# Patient Record
Sex: FEMALE | Race: WHITE | NOT HISPANIC OR LATINO | URBAN - METROPOLITAN AREA
[De-identification: names, ages, dates, MRNs, and addresses within clinical notes are randomized per-mention and may not be internally consistent; named-entity substitution may affect disease eponyms.]

---

## 2017-01-02 ENCOUNTER — OFFICE VISIT - RIVER FALLS (OUTPATIENT)
Dept: FAMILY MEDICINE | Facility: CLINIC | Age: 52
End: 2017-01-02

## 2017-01-02 ASSESSMENT — MIFFLIN-ST. JEOR: SCORE: 1365.41

## 2017-01-06 ENCOUNTER — TRANSFERRED RECORDS (OUTPATIENT)
Dept: HEALTH INFORMATION MANAGEMENT | Facility: CLINIC | Age: 52
End: 2017-01-06

## 2017-01-06 LAB — HPV ABSTRACT: NORMAL

## 2017-08-16 ENCOUNTER — OFFICE VISIT - RIVER FALLS (OUTPATIENT)
Dept: FAMILY MEDICINE | Facility: CLINIC | Age: 52
End: 2017-08-16

## 2017-08-16 ASSESSMENT — MIFFLIN-ST. JEOR: SCORE: 1350.89

## 2017-08-17 LAB
CREAT SERPL-MCNC: 0.92 MG/DL (ref 0.5–1.05)
GLUCOSE BLD-MCNC: 78 MG/DL (ref 65–99)

## 2018-01-18 ENCOUNTER — OFFICE VISIT - RIVER FALLS (OUTPATIENT)
Dept: FAMILY MEDICINE | Facility: CLINIC | Age: 53
End: 2018-01-18

## 2018-01-18 ASSESSMENT — MIFFLIN-ST. JEOR: SCORE: 1370.85

## 2018-04-24 ENCOUNTER — OFFICE VISIT - RIVER FALLS (OUTPATIENT)
Dept: FAMILY MEDICINE | Facility: CLINIC | Age: 53
End: 2018-04-24

## 2018-04-24 ASSESSMENT — MIFFLIN-ST. JEOR: SCORE: 1374.48

## 2018-10-15 ENCOUNTER — OFFICE VISIT - RIVER FALLS (OUTPATIENT)
Dept: FAMILY MEDICINE | Facility: CLINIC | Age: 53
End: 2018-10-15

## 2018-10-15 ASSESSMENT — MIFFLIN-ST. JEOR: SCORE: 1374.48

## 2018-12-04 ENCOUNTER — OFFICE VISIT - RIVER FALLS (OUTPATIENT)
Dept: FAMILY MEDICINE | Facility: CLINIC | Age: 53
End: 2018-12-04

## 2018-12-04 ASSESSMENT — MIFFLIN-ST. JEOR: SCORE: 1374.48

## 2019-08-15 ENCOUNTER — OFFICE VISIT - RIVER FALLS (OUTPATIENT)
Dept: FAMILY MEDICINE | Facility: CLINIC | Age: 54
End: 2019-08-15

## 2019-08-15 ASSESSMENT — MIFFLIN-ST. JEOR: SCORE: 1349.08

## 2019-11-26 ENCOUNTER — OFFICE VISIT - RIVER FALLS (OUTPATIENT)
Dept: FAMILY MEDICINE | Facility: CLINIC | Age: 54
End: 2019-11-26

## 2019-11-26 ASSESSMENT — MIFFLIN-ST. JEOR: SCORE: 1348.17

## 2019-11-28 LAB — BACTERIA SPEC CULT: ABNORMAL

## 2019-11-29 ENCOUNTER — COMMUNICATION - RIVER FALLS (OUTPATIENT)
Dept: FAMILY MEDICINE | Facility: CLINIC | Age: 54
End: 2019-11-29

## 2019-12-05 ENCOUNTER — COMMUNICATION - RIVER FALLS (OUTPATIENT)
Dept: FAMILY MEDICINE | Facility: CLINIC | Age: 54
End: 2019-12-05

## 2020-02-25 ENCOUNTER — OFFICE VISIT - RIVER FALLS (OUTPATIENT)
Dept: FAMILY MEDICINE | Facility: CLINIC | Age: 55
End: 2020-02-25

## 2020-02-25 ASSESSMENT — MIFFLIN-ST. JEOR: SCORE: 1402.6

## 2020-03-30 ENCOUNTER — COMMUNICATION - RIVER FALLS (OUTPATIENT)
Dept: FAMILY MEDICINE | Facility: CLINIC | Age: 55
End: 2020-03-30

## 2020-11-30 ENCOUNTER — COMMUNICATION - RIVER FALLS (OUTPATIENT)
Dept: FAMILY MEDICINE | Facility: CLINIC | Age: 55
End: 2020-11-30

## 2020-12-08 ENCOUNTER — OFFICE VISIT - RIVER FALLS (OUTPATIENT)
Dept: FAMILY MEDICINE | Facility: CLINIC | Age: 55
End: 2020-12-08

## 2021-06-24 ENCOUNTER — OFFICE VISIT - RIVER FALLS (OUTPATIENT)
Dept: FAMILY MEDICINE | Facility: CLINIC | Age: 56
End: 2021-06-24

## 2021-07-23 ENCOUNTER — COMMUNICATION - RIVER FALLS (OUTPATIENT)
Dept: FAMILY MEDICINE | Facility: CLINIC | Age: 56
End: 2021-07-23

## 2022-02-11 VITALS
TEMPERATURE: 97.1 F | HEIGHT: 68 IN | HEART RATE: 89 BPM | BODY MASS INDEX: 24.52 KG/M2 | OXYGEN SATURATION: 98 % | DIASTOLIC BLOOD PRESSURE: 76 MMHG | SYSTOLIC BLOOD PRESSURE: 124 MMHG | WEIGHT: 161.8 LBS

## 2022-02-11 VITALS
OXYGEN SATURATION: 99 % | WEIGHT: 161 LBS | HEIGHT: 68 IN | SYSTOLIC BLOOD PRESSURE: 118 MMHG | TEMPERATURE: 97.4 F | HEART RATE: 90 BPM | DIASTOLIC BLOOD PRESSURE: 74 MMHG | BODY MASS INDEX: 24.4 KG/M2

## 2022-02-11 VITALS
WEIGHT: 156.6 LBS | OXYGEN SATURATION: 99 % | HEIGHT: 68 IN | SYSTOLIC BLOOD PRESSURE: 122 MMHG | BODY MASS INDEX: 23.73 KG/M2 | DIASTOLIC BLOOD PRESSURE: 66 MMHG | HEART RATE: 85 BPM

## 2022-02-11 VITALS
HEIGHT: 68 IN | BODY MASS INDEX: 25.46 KG/M2 | WEIGHT: 168 LBS | SYSTOLIC BLOOD PRESSURE: 124 MMHG | DIASTOLIC BLOOD PRESSURE: 86 MMHG | HEART RATE: 80 BPM

## 2022-02-11 VITALS
BODY MASS INDEX: 23.64 KG/M2 | OXYGEN SATURATION: 97 % | HEIGHT: 68 IN | WEIGHT: 156 LBS | TEMPERATURE: 97.6 F | HEART RATE: 103 BPM

## 2022-02-11 VITALS
TEMPERATURE: 96 F | HEIGHT: 68 IN | HEART RATE: 68 BPM | DIASTOLIC BLOOD PRESSURE: 76 MMHG | SYSTOLIC BLOOD PRESSURE: 116 MMHG | BODY MASS INDEX: 24.22 KG/M2 | WEIGHT: 159.8 LBS

## 2022-02-11 VITALS
SYSTOLIC BLOOD PRESSURE: 128 MMHG | BODY MASS INDEX: 24.52 KG/M2 | OXYGEN SATURATION: 92 % | DIASTOLIC BLOOD PRESSURE: 72 MMHG | HEIGHT: 68 IN | WEIGHT: 161.8 LBS | HEART RATE: 102 BPM | TEMPERATURE: 97.4 F

## 2022-02-11 VITALS
BODY MASS INDEX: 23.67 KG/M2 | OXYGEN SATURATION: 99 % | HEIGHT: 68 IN | HEART RATE: 87 BPM | DIASTOLIC BLOOD PRESSURE: 88 MMHG | SYSTOLIC BLOOD PRESSURE: 122 MMHG | WEIGHT: 156.2 LBS

## 2022-02-11 VITALS
HEIGHT: 68 IN | HEART RATE: 93 BPM | WEIGHT: 161.8 LBS | DIASTOLIC BLOOD PRESSURE: 80 MMHG | BODY MASS INDEX: 24.52 KG/M2 | SYSTOLIC BLOOD PRESSURE: 126 MMHG | TEMPERATURE: 97.5 F | OXYGEN SATURATION: 99 %

## 2022-02-16 NOTE — TELEPHONE ENCOUNTER
"---------------------  From: Jayla Santana CMA   To: Giovanni Rothman MD;     Sent: 3/26/2020 3:40:14 PM CDT  Subject: General Message     Has had a bronchitis cough since her toe injury in Feb.. she states that it is icky tasting and \"can't shake it\"  she is wondering if there is something that she can take for it.      Lydia  [phone]---------------------  From: Giovanni Rothman MD   To: Jayla Santana CMA;     Sent: 3/26/2020 4:13:29 PM CDT  Subject: RE: General Message     eRx sentpt notifiedpt called back to say that med has done nothing and last time she ended up with 2 abx and the 2nd one helped....per record review, Joe was called to [facility]..If pt not better after finishing then she will need to have a telephone visit with [facility].  Pt voiced understanding  "

## 2022-02-16 NOTE — NURSING NOTE
Comprehensive Intake Entered On:  11/26/2019 2:31 PM CST    Performed On:  11/26/2019 2:29 PM CST by Jayla Santana CMA               Summary   Chief Complaint :   Pt here for ST   Weight Measured :   156 lb(Converted to: 156 lb 0 oz, 70.76 kg)    Height Measured :   67.5 in(Converted to: 5 ft 7 in, 171.45 cm)    Body Mass Index :   24.07 kg/m2   Body Surface Area :   1.83 m2   Peripheral Pulse Rate :   103 bpm (HI)    Temperature Tympanic :   97.6 DegF(Converted to: 36.4 DegC)  (LOW)    Oxygen Saturation :   97 %   Jayla Santana CMA - 11/26/2019 2:29 PM CST   Health Status   Allergies Verified? :   Yes   Medication History Verified? :   Yes   Medical History Verified? :   Yes   Pre-Visit Planning Status :   Not completed   Tobacco Use? :   Current every day smoker   Jayla Santana CMA - 11/26/2019 2:29 PM CST   Consents   Consent for Immunization Exchange :   Consent Granted   Consent for Immunizations to Providers :   Consent Granted   Jayla Santana CMA - 11/26/2019 2:29 PM CST   Meds / Allergies   (As Of: 11/26/2019 2:31:56 PM CST)   Allergies (Active)   No known allergies  Estimated Onset Date:   Unspecified ; Created By:   Lydia Ruvalcaba; Reaction Status:   Active ; Category:   Drug ; Substance:   No known allergies ; Type:   Allergy ; Updated By:   Lydia Ruvalcaba; Reviewed Date:   11/26/2019 2:29 PM CST        Medication List   (As Of: 11/26/2019 2:31:56 PM CST)   Prescription/Discharge Order    ALPRAZolam  :   ALPRAZolam ; Status:   Prescribed ; Ordered As Mnemonic:   ALPRAZolam 0.25 mg oral tablet ; Simple Display Line:   0.25 mg, 1 tab(s), PO, daily, PRN: for anxiety, 30 tab(s), 0 Refill(s) ; Ordering Provider:   Giovanni Rothman MD; Catalog Code:   ALPRAZolam ; Order Dt/Tm:   8/15/2019 1:20:20 PM CDT          fluticasone nasal  :   fluticasone nasal ; Status:   Prescribed ; Ordered As Mnemonic:   fluticasone 50 mcg/inh nasal spray ; Simple Display Line:   2 spray(s), nasal, bid, 1 EA, 5 Refill(s) ;  Ordering Provider:   Giovanni Rothman MD; Catalog Code:   fluticasone nasal ; Order Dt/Tm:   8/15/2019 1:19:59 PM CDT          hydrochlorothiazide-lisinopril  :   hydrochlorothiazide-lisinopril ; Status:   Prescribed ; Ordered As Mnemonic:   hydrochlorothiazide-lisinopril 12.5 mg-10 mg oral tablet ; Simple Display Line:   0.5 tab(s), Oral, daily, due for med appt, 45 tab(s), 1 Refill(s) ; Ordering Provider:   Giovanni Rothman MD; Catalog Code:   hydrochlorothiazide-lisinopril ; Order Dt/Tm:   8/15/2019 1:19:31 PM CDT

## 2022-02-16 NOTE — NURSING NOTE
Vital Signs Entered On:  8/14/2019 1:20 PM CDT    Performed On:  8/14/2019 1:19 PM CDT by Jayla Santana CMA               Vital Signs   Systolic Blood Pressure :   124 mmHg   Diastolic Blood Pressure :   76 mmHg   Mean Arterial Pressure :   92 mmHg   Peripheral Pulse Rate :   89 bpm   Jayla Santana CMA - 8/14/2019 1:19 PM CDT

## 2022-02-16 NOTE — LETTER
(Inserted Image. Unable to display)     April 29, 2019      TYLER GARCIAYNOR   [place], [place] [id number]          Dear TYLER,      Thank you for selecting [facility] (previously [facility]) for your healthcare needs.    Our records indicate you are due for the following services:    Annual Physical.  Hypertension check ~ please remember to bring your at-home blood pressure readings with you to your appointment.   Non-Fasting Labs.    If you had your labs done at another facility or with Direct Access Lab Testing at [facility], please bring in a copy of the results to your next visit, mail a copy, or drop off a copy of your results to your Healthcare Provider.    You are due for lab work and an office visit; please schedule the lab appointment 1 week before the office visit.  This will assure all results are available to discuss with your provider during your visit.    **It is very helpful if you bring your medication bottles to your appointment.  This assures we have all of your current medications, including strength and dosing information, documented accurately in your medical record.    To schedule an appointment or if you have further questions, please contact your primary clinic:   [facility]       [phone]   [facility]       [phone]              [facility]     [phone]      Powered by [employer] and [employer]    Sincerely,    Giovanni Rothman MD

## 2022-02-16 NOTE — PROGRESS NOTES
Patient is called and informed of chest CT showing 5 small pulmonary nodules, given active smoking hx she is at   high risk, per guidelines will repeat chest CT in 3 months.    She is also having ongoing intermittent LLQ pain.  No response to treatment for diverticulitis in mid Jan.  Chest and abdominal  CT do not reveal a cause for this ongoing pain.  Will refer to GI for further evaluation

## 2022-02-16 NOTE — NURSING NOTE
Comprehensive Intake Entered On:  8/15/2019 1:09 PM CDT    Performed On:  8/15/2019 12:59 PM CDT by Jayla Santana CMA               Summary   Chief Complaint :   Pt here for med ck   Weight Measured :   156.2 lb(Converted to: 156 lb 3 oz, 70.85 kg)    Height Measured :   67.5 in(Converted to: 5 ft 7 in, 171.45 cm)    Body Mass Index :   24.1 kg/m2   Body Surface Area :   1.84 m2   Systolic Blood Pressure :   122 mmHg   Diastolic Blood Pressure :   88 mmHg (HI)    Mean Arterial Pressure :   99 mmHg   Peripheral Pulse Rate :   87 bpm   Oxygen Saturation :   99 %   Jayla Santana CMA - 8/15/2019 12:59 PM CDT   Health Status   Allergies Verified? :   Yes   Medication History Verified? :   Yes   Medical History Verified? :   Yes   Pre-Visit Planning Status :   Completed   Tobacco Use? :   Current every day smoker   Jayla Santana CMA - 8/15/2019 12:59 PM CDT   Consents   Consent for Immunization Exchange :   Consent Granted   Consent for Immunizations to Providers :   Consent Granted   Jayla Santana CMA - 8/15/2019 12:59 PM CDT   Meds / Allergies   (As Of: 8/15/2019 1:09:23 PM CDT)   Allergies (Active)   No known allergies  Estimated Onset Date:   Unspecified ; Created By:   Lydia Ruvalcaba; Reaction Status:   Active ; Category:   Drug ; Substance:   No known allergies ; Type:   Allergy ; Updated By:   Lydia Ruvalcaba; Reviewed Date:   8/15/2019 1:08 PM CDT        Medication List   (As Of: 8/15/2019 1:09:23 PM CDT)   Prescription/Discharge Order    ALPRAZolam  :   ALPRAZolam ; Status:   Prescribed ; Ordered As Mnemonic:   ALPRAZolam 0.25 mg oral tablet ; Simple Display Line:   0.25 mg, 1 tab(s), PO, daily, PRN: for anxiety, 30 tab(s), 0 Refill(s) ; Ordering Provider:   Giovanni Rothman MD; Catalog Code:   ALPRAZolam ; Order Dt/Tm:   4/24/2018 3:51:40 PM          fluticasone nasal  :   fluticasone nasal ; Status:   Prescribed ; Ordered As Mnemonic:   fluticasone 50 mcg/inh nasal spray ; Simple Display Line:   2  spray(s), nasal, bid, 1 EA, 5 Refill(s) ; Ordering Provider:   Giovanni Rothman MD; Catalog Code:   fluticasone nasal ; Order Dt/Tm:   12/4/2018 4:50:16 PM          hydrochlorothiazide-lisinopril  :   hydrochlorothiazide-lisinopril ; Status:   Prescribed ; Ordered As Mnemonic:   hydrochlorothiazide-lisinopril 12.5 mg-10 mg oral tablet ; Simple Display Line:   1 tab(s), Oral, daily, due for med appt, 90 tab(s), 0 Refill(s) ; Ordering Provider:   Giovanni Rothman MD; Catalog Code:   hydrochlorothiazide-lisinopril ; Order Dt/Tm:   4/10/2019 4:10:06 PM          lidocaine topical  :   lidocaine topical ; Status:   Processing ; Ordered As Mnemonic:   lidocaine 5% topical film ; Ordering Provider:   Giovanni Rothman MD; Action Display:   Complete ; Catalog Code:   lidocaine topical ; Order Dt/Tm:   8/15/2019 1:08:08 PM

## 2022-02-16 NOTE — TELEPHONE ENCOUNTER
Entered by Jayla Santana CMA on March 30, 2020 8:59:54 AM CDT  ---------------------  From: Jayla Santana CMA   To: [facility]    Sent: 3/30/2020 8:59:54 AM CDT  Subject: Medication Management     ** Submitted: **  Order:varenicline (Chantix Continuing Month 1 mg oral tablet)  See Instructions  TAKE ONE (1) TABLET TWICE DAILY  Qty:  56 EA        Days Supply:  28        Refills:  0          Substitutions Allowed     Route To [facility]    Signed by Jayla Santana CMA  3/30/2020 1:59:00 PM    ** Submitted: **  Complete:varenicline (Chantix Continuing Month 1 mg oral tablet)   Signed by Jayla Santana CMA  3/30/2020 1:59:00 PM    ** Not Approved:  **  varenicline (CHANTIX VANDANA 1MG)  TAKE ONE (1) TABLET TWICE DAILY  Qty:  56 EA        Days Supply:  28        Refills:  0          Substitutions Allowed     Route To [facility]   Signed by Jayla Santana CMA            ** Patient matched by Jayla Santana CMA on 3/30/2020 8:59:30 AM CDT **      ------------------------------------------  From: [facility]  To: Giovanni Rothman MD  Sent: March 26, 2020 5:11:02 PM CDT  Subject: Medication Management  Due: March 27, 2020 5:11:02 PM CDT    ** On Hold Pending Signature **  Drug: varenicline (Chantix Continuing Month 1 mg oral tablet)  TAKE ONE (1) TABLET TWICE DAILY  Quantity: 56 EA  Days Supply: 28  Refills: 0  Substitutions Allowed  Notes from Pharmacy:     Dispensed Drug: varenicline (Chantix Continuing Month 1 mg oral tablet)  TAKE ONE (1) TABLET TWICE DAILY  Quantity: 56 EA  Days Supply: 28  Refills: 0  Substitutions Allowed  Notes from Pharmacy:   ------------------------------------------

## 2022-02-16 NOTE — NURSING NOTE
Depression Screening Entered On:  6/24/2021 4:14 PM CDT    Performed On:  6/24/2021 4:14 PM CDT by Kaila Prabhakar CMA               Depression Screening   Little Interest - Pleasure in Activities :   Not at all   Feeling Down, Depressed, Hopeless :   Not at all   Initial Depression Screen Score :   0 Score   Kaila Prabhakar CMA - 6/24/2021 4:14 PM CDT

## 2022-02-16 NOTE — TELEPHONE ENCOUNTER
---------------------  From: Sierra Corona RN (eRx Pool ([id number]_[facility]))   To: Phone [facility] ([id number]);     Sent: 6/24/2021 12:26:03 PM CDT  Subject: FW: Medication Management   Due Date/Time: 6/25/2021 10:02:00 AM CDT     Pt was a [facility] pt and has not seen any other provider  She is due NOW for 6 month med check  She is past due for non-fasting labs    I LVM on listed number asking for a call back. We can fill 30 day protocol when she picks a new provider and makes appt for lab and visit.      ------------------------------------------  From: [facility]  To: Giovanni Rothman MD  Sent: June 24, 2021 10:02:39 AM CDT  Subject: Medication Management  Due: June 4, 2021 8:25:53 PM CDT     ** On Hold Pending Signature **     Drug: hydrochlorothiazide-lisinopril (hydrochlorothiazide-lisinopril 12.5 mg-10 mg oral tablet), TAKE ONE (1) TAB(S) ORAL DAILY  Quantity: 90 tab(s)  Days Supply: 90  Refills: 0  Substitutions Allowed  Notes from Pharmacy:     Dispensed Drug: hydrochlorothiazide-lisinopril (hydrochlorothiazide-lisinopril 12.5 mg-10 mg oral tablet), TAKE ONE (1) TAB(S) ORAL DAILY  Quantity: 90 tab(s)  Days Supply: 90  Refills: 0  Substitutions Allowed  Notes from Pharmacy:  ------------------------------------------MLTCB---------------------  From: Elana Hamm CMA (Phone Messages Pool ([id number]_[facility]))   To: Advanced Practice Provider Pool ([id number]_[facility]);     Sent: 6/25/2021 10:42:44 AM CDT  Subject: FW: Medication Management   Due Date/Time: 6/25/2021 10:02:00 AM CDTwill fill for 90 days, no further refills without clinic visit---------------------  From: Giovanni Viramontes PA-C   To: [facility]    Sent: 6/25/2021 12:39:20 PM CDT  Subject: FW: Medication Management     ** Submitted: **  Complete:hydrochlorothiazide-lisinopril (hydrochlorothiazide-lisinopril 12.5 mg-10 mg oral tablet)   Signed by Giovanni Viramontes PA-C  6/25/2021 5:39:00 PM [facility]    ** Approved  **  hydrochlorothiazide-lisinopril (LISINOP/HCTZ 10-12.5)  TAKE ONE (1) TAB(S) ORAL DAILY  Qty:  90 tab(s)        Days Supply:  90        Refills:  0          Substitutions Allowed     Route To Pharmacy - [facility]   Signed by Wander BROWN, Giovanni PETIT

## 2022-02-16 NOTE — PROGRESS NOTES
Patient:   TYLER ANDRADE            MRN: [id number]            FIN: [id number]               Age:   53 years     Sex:  Female     :  1965   Associated Diagnoses:   Acute bronchitis   Author:   Giovanni Rothman MD      Impression and Plan   Diagnosis     Acute bronchitis ([id number] J20.9).     Course:  Worsening.    Orders     Orders   Charges (Evaluation and Management):  90312 office outpatient visit 15 minutes (Charge) (Order): Quantity: 1, Acute bronchitis.     Orders (Selected)   Prescriptions  Prescribed  azithromycin 500 mg oral tablet: = 1 tab(s) ( 500 mg ), PO, Daily, # 7 tab(s), 0 Refill(s), Type: Maintenance, Pharmacy: [facility], 1 tab(s) Oral daily,x7 day(s).        Visit Information      Date of Service: 10/15/2018 04:20 pm  Performing Location: [facility]  Encounter#: [id number]      Primary Care Provider (PCP):  Giovanni Rothman MD    NPI# [id number]   Visit type:  New symptom.    Accompanied by:  No one.    Source of history:  Self.    History limitation:  None.       Chief Complaint   Chief complaint discussed and confirmed correct.     10/15/2018 4:27 PM CDT   Pt here for cough        History of Present Illness             The patient presents with cough.  The cough is described as paroxysmal and productive yellow sputum.  The severity of the cough is severe.  The cough is worsening.  The context of the cough: occurred in association with illness.  There are no modifying factors.  Associated symptoms consist of none.        Review of Systems   Constitutional:  Negative.    Eye:  Negative.    Ear/Nose/Mouth/Throat:  Negative.    Respiratory:  Cough.    Cardiovascular:  Negative.    Gastrointestinal:  Negative.    Genitourinary:  Negative.    Hematology/Lymphatics:  Negative.    Endocrine:  Negative.    Immunologic:  Negative.    Musculoskeletal:  Negative.    Integumentary:  Negative.    Neurologic:  Negative.    Psychiatric:  Negative.    All other systems reviewed and  negative      Health Status   Allergies:    Allergic Reactions (Selected)  No known allergies   Medications:  (Selected)   Prescriptions  Prescribed  ALPRAZolam 0.25 mg oral tablet: 1 tab(s) ( 0.25 mg ), PO, daily, PRN: for anxiety, # 30 tab(s), 0 Refill(s), Type: Maintenance, Pharmacy: [facility], 1 tab(s) po daily,PRN:for anxiety  Chantix 1 mg oral tablet: 1 tab(s) ( 1 mg ), po, bid, # 60 tab(s), 2 Refill(s), Type: Maintenance, Pharmacy: [facility], 1 tab(s) Oral bid  Chantix Starter Pack 0.5 mg-1 mg oral tablet: See Instructions, Instructions: 0.5 mg po daily for 3 days, then 0.5 mg po BID for 4 days, then 1.0 mg po BID, # 1 kit(s), 2 Refill(s), Type: Maintenance, Pharmacy: [facility], 0.5 mg po daily for 3 days, then 0.5 mg po BID for 4 days, then 1....  azithromycin 500 mg oral tablet: = 1 tab(s) ( 500 mg ), PO, Daily, # 7 tab(s), 0 Refill(s), Type: Maintenance, Pharmacy: [facility], 1 tab(s) Oral daily,x7 day(s)  fluticasone 50 mcg/inh nasal spray: 1 spray(s), nasal, daily, # 1 EA, 5 Refill(s), Type: Soft Stop, Pharmacy: [facility], 1 spray(s) Nasal daily  hydrochlorothiazide-lisinopril 12.5 mg-10 mg oral tablet: 1 tab(s), PO, Daily, # 90 tab(s), 1 Refill(s), Type: Maintenance, Pharmacy: [facility], 1 tab(s) Oral daily   Problem list:    All Problems  Allergic Rhinitis / SNOMED CT 91320431 / Confirmed  Anxiety / SNOMED CT 34633071 / Confirmed  Benign essential HTN / SNOMED CT 7025943 / Confirmed  Hypertension / ICD-9-.9 / Confirmed  Methamphetamine Abuse / ICD-9-.70 / Confirmed  Multiple pulmonary nodules / SNOMED CT 3350021741 / Confirmed  Nicotine Dependence / ICD-9-.1 / Confirmed  Raynaud's disease / ICD-9-.0 / Confirmed  Sciatica / ICD-9-.3 / Confirmed      Histories   Past Medical History:    Active  Hypertension (401.9)  Anxiety (84264212)  Sciatica (724.3)  Raynaud's disease (443.0)  Allergic Rhinitis (80878287)   Family  History:    Hypertension  Mother ()  Sister     Procedure history:    History of lumbar fusion (SNOMED CT 7796402668) performed by Thien Blevins MD on 2017 at 52 Years.  Comments:  2017 1:35 PM - Jannette Hyde  Day #1 - L3-4, L4-5, L5-S1 fusion.  Day #2 - L3-S1 fusion.  Carpal tunnel decompression (SNOMED CT 6488587991) in 2017 at 52 Years.  Trigger finger of right hand (SNOMED CT 533520618979765) in  at 47 Years.  Tubal ligation (SNOMED CT 421033586).  cervical cryotherapy.   Social History:        Alcohol Assessment: Denies Alcohol Use            Past      Tobacco Assessment: Current            Current, Cigarettes, 20 per day.      Exercise and Physical Activity Assessment: Regular exercise        Physical Examination   Vital signs reviewed  and within acceptable limits    Vital Signs   10/15/2018 4:27 PM CDT Temperature Tympanic 97.4 DegF  LOW    Peripheral Pulse Rate 102 bpm  HI    Systolic Blood Pressure 128 mmHg    Diastolic Blood Pressure 72 mmHg    Mean Arterial Pressure 91 mmHg    Oxygen Saturation 92 %  LOW      Measurements from flowsheet : Measurements   10/15/2018 4:27 PM CDT Height Measured - Standard 67.5 in    Weight Measured - Standard 161.8 lb    BSA 1.87 m2    Body Mass Index 24.96 kg/m2      General:  Alert and oriented, No acute distress.    Eye:  Pupils are equal, round and reactive to light, Extraocular movements are intact, Normal conjunctiva.    HENT:  Normocephalic, Tympanic membranes are clear, Normal hearing, Oral mucosa is moist, No pharyngeal erythema, No sinus tenderness.    Neck:  Supple, Non-tender, No lymphadenopathy, No thyromegaly.    Respiratory:  Lungs are clear to auscultation, Respirations are non-labored, Breath sounds are equal, demonstrates frequent loose cough.    Cardiovascular:  Normal rate, Regular rhythm, No murmur, Normal peripheral perfusion, No edema.    Integumentary:  Warm, Dry, Pink.    Psychiatric:  Cooperative, Appropriate mood & affect,  Normal judgment.

## 2022-02-16 NOTE — TELEPHONE ENCOUNTER
Entered by Jayla Santana CMA on March 26, 2019 7:43:43 AM CDT  ---------------------  From: Jayla Santana CMA   To: [facility]    Sent: 3/26/2019 7:43:43 AM CDT  Subject: Medication Management     ** Submitted: **  Order:hydrochlorothiazide-lisinopril (hydrochlorothiazide-lisinopril 12.5 mg-10 mg oral tablet)  1 tab(s)  Oral  daily  due for med appt  Qty:  30 tab(s)        Refills:  0          Substitutions Allowed     Route To [facility]    Signed by Jayla Santana CMA  3/26/2019 7:43:00 AM    ** Submitted: **  Complete:hydrochlorothiazide-lisinopril (hydrochlorothiazide-lisinopril 12.5 mg-10 mg oral tablet)   Signed by Jayla Santana CMA  3/26/2019 7:43:00 AM    ** Not Approved:  **  hydrochlorothiazide-lisinopril (Lisinopril-Hydrochlorothiazide Tablet 10-12.5 MG)  Take one (1) tablet daily  Qty:  90 EA        Days Supply:  0        Refills:  0          Substitutions Allowed     Route To [facility]   Signed by Jayla Santana CMA            ** Patient matched by Jayla Santana CMA on 3/26/2019 7:42:18 AM CDT **      ------------------------------------------  From: [facility]  To: Giovanni Rothman MD  Sent: March 25, 2019 3:46:18 PM CDT  Subject: Medication Management  Due: March 26, 2019 3:46:18 PM CDT    ** On Hold Pending Signature **  Drug: hydrochlorothiazide-lisinopril (hydrochlorothiazide-lisinopril 12.5 mg-10 mg oral tablet)  Take one (1) tablet daily  Quantity: 90 EA       Days Supply: 0         Refills: 0  Substitutions Allowed  Notes from Pharmacy:     Dispensed Drug: hydrochlorothiazide-lisinopril (hydrochlorothiazide-lisinopril 12.5 mg-10 mg oral tablet)  Take one (1) tablet daily  Quantity: 90 EA       Days Supply: 0         Refills: 0  Substitutions Allowed  Notes from Pharmacy:   ------------------------------------------

## 2022-02-16 NOTE — CARE COORDINATION
Pt appears on  [facility] chronic disease panel as out of parameters for tobacco use.  Mellisa Resendez, CMA.

## 2022-02-16 NOTE — CARE COORDINATION
Pt appears on [facility] chronic disease panel as out of parameters for _HTN/Tobacco.  RTC placed today for Med ck and labs in 2 months (2/2018), provider to discuss tobacco cessation next visit

## 2022-02-16 NOTE — TELEPHONE ENCOUNTER
Entered by Jayla Santana CMA on December 05, 2019 8:37:01 AM CST  ---------------------  From: Jayla Santana CMA   To: [facility]    Sent: 12/5/2019 8:37:00 AM CST  Subject: Medication Management     ** Submitted: **  Order:varenicline (Chantix Continuing Month 1 mg oral tablet)  1 tab(s)  Oral  bid  Qty:  56 tab(s)        Refills:  0          Substitutions Allowed     Route To [facility]    Signed by Jayla Santana CMA  12/5/2019 8:36:00 AM    ** Not Approved:  **  varenicline (CHANTIX VANDANA 1MG)  TAKE ONE (1) TABLET TWICE DAILY  Qty:  56 EA        Days Supply:  28        Refills:  0          Substitutions Allowed     Route To [facility]   Signed by Jayla Santana CMA            ** Patient matched by Jayla Santana CMA on 12/5/2019 8:36:25 AM CST **      ------------------------------------------  From: [facility]  To: Giovanni Rothman MD  Sent: December 4, 2019 2:27:50 PM CST  Subject: Medication Management  Due: December 5, 2019 2:27:50 PM CST    ** On Hold Pending Signature **  Drug: varenicline (Chantix Continuing Month 1 mg oral tablet)  TAKE ONE (1) TABLET TWICE DAILY  Quantity: 56 EA  Days Supply: 28  Refills: 0  Substitutions Allowed  Notes from Pharmacy:     Dispensed Drug: varenicline (Chantix Continuing Month 1 mg oral tablet)  TAKE ONE (1) TABLET TWICE DAILY  Quantity: 56 EA  Days Supply: 28  Refills: 0  Substitutions Allowed  Notes from Pharmacy:   ------------------------------------------

## 2022-02-16 NOTE — PROGRESS NOTES
Patient:   TYLER ANDRADE            MRN: [id number]            FIN: [id number]               Age:   53 years     Sex:  Female     :  1965   Associated Diagnoses:   Chronic cough; Right-sided thoracic back pain   Author:   Giovanni Rothman MD      Impression and Plan   Diagnosis     Chronic cough (ZBJ16-ON R05).     Right-sided thoracic back pain (GKO90-SI M54.6).     Course:  Worsening.    Orders     Orders   Charges (Evaluation and Management):  51765 office outpatient visit 25 minutes (Charge) (Order): Quantity: 1, Chronic cough  Right-sided thoracic back pain.     Orders (Selected)   Outpatient Orders  Ordered  CT Chest w/ Contrast (Request): Chronic cough  Prescriptions  Prescribed  fluticasone 50 mcg/inh nasal spray: = 2 spray(s), nasal, bid, # 1 EA, 5 Refill(s), Type: Soft Stop, Pharmacy: [facility], 2 spray(s) Nasal bid  lidocaine 5% topical film: 1 patch(es), Topical, daily, # 15 patch(es), 0 Refill(s), Type: Maintenance, Pharmacy: [facility], 1 patch(es) Topical daily.     Alleve 2 tabs PO BID.        Visit Information      Date of Service: 2018 04:20 pm  Performing Location: [facility]  Encounter#: [id number]      Primary Care Provider (PCP):  Giovanni Rothman MD    NPI# [id number]   Visit type:  Scheduled follow-up.    Accompanied by:  No one.    Source of history:  Self.    Referral source:  Self.    History limitation:  None.       Chief Complaint   2018 4:30 PM CST    Pt here for continued cough, back pain and trouble breathing        History of Present Illness             The patient presents with cough.  The cough is described as productive white sputum and when she lays down.  The severity of the cough is moderate.  The cough is episodic.  The cough has lasted for 2 month(s).  Exacerbating factors consist of allergen exposure, lying flat and smoke exposure.  Relieving factors consist of none.  Associated symptoms consist of chest pain, nasal  congestion, rhinorrhea, shortness of breath, denies chills and denies fever.  Did not respond to Azithromycin back in October.        Review of Systems   Constitutional:  Negative.    Eye:  Negative.    Ear/Nose/Mouth/Throat:  Negative.    Respiratory:  Negative except as documented in history of present illness.    Cardiovascular:  Negative.    Gastrointestinal:  Negative.    Genitourinary:  Negative.    Hematology/Lymphatics:  Negative.    Endocrine:  Negative.    Immunologic:  Negative.    Musculoskeletal:       Back pain: On the right side, In the upper region.    Integumentary:  Negative.    Neurologic:  Negative.    Psychiatric:  Negative.    All other systems reviewed and negative      Health Status   Allergies:    Allergic Reactions (Selected)  No known allergies   Medications:  (Selected)   Prescriptions  Prescribed  ALPRAZolam 0.25 mg oral tablet: 1 tab(s) ( 0.25 mg ), PO, daily, PRN: for anxiety, # 30 tab(s), 0 Refill(s), Type: Maintenance, Pharmacy: [facility], 1 tab(s) po daily,PRN:for anxiety  fluticasone 50 mcg/inh nasal spray: = 2 spray(s), nasal, bid, # 1 EA, 5 Refill(s), Type: Soft Stop, Pharmacy: [facility], 2 spray(s) Nasal bid  hydrochlorothiazide-lisinopril 12.5 mg-10 mg oral tablet: 1 tab(s), PO, Daily, # 90 tab(s), 1 Refill(s), Type: Maintenance, Pharmacy: [facility], 1 tab(s) Oral daily  lidocaine 5% topical film: 1 patch(es), Topical, daily, # 15 patch(es), 0 Refill(s), Type: Maintenance, Pharmacy: [facility], 1 patch(es) Topical daily   Problem list:    All Problems  Allergic Rhinitis / SNOMED CT 65008554 / Confirmed  Anxiety / SNOMED CT 52560606 / Confirmed  Benign essential HTN / SNOMED CT 8068125 / Confirmed  Hypertension / ICD-9-.9 / Confirmed  Methamphetamine Abuse / ICD-9-.70 / Confirmed  Multiple pulmonary nodules / SNOMED CT 1631722051 / Confirmed  Nicotine Dependence / ICD-9-.1 / Confirmed  Raynaud's disease / ICD-9-.0 /  Confirmed  Sciatica / ICD-9-.3 / Confirmed      Histories   Past Medical History:    Active  Hypertension (401.9)  Anxiety (04769437)  Sciatica (724.3)  Raynaud's disease (443.0)  Allergic Rhinitis (33387675)   Family History:    Hypertension  Mother ()  Sister     Procedure history:    History of lumbar fusion (SNOMED CT 2876410078) performed by Thien Blevins MD on 2017 at 52 Years.  Comments:  2017 1:35 PM CDT - Jannette Hyde  Day #1 - L3-4, L4-5, L5-S1 fusion.  Day #2 - L3-S1 fusion.  Carpal tunnel decompression (SNOMED CT 7210760810) in 2017 at 52 Years.  Trigger finger of right hand (SNOMED CT 746170445407692) in  at 47 Years.  Tubal ligation (SNOMED CT 538399513).  cervical cryotherapy.   Social History:        Alcohol Assessment: Denies Alcohol Use            Past      Tobacco Assessment: Current            Current, Cigarettes, 20 per day.      Exercise and Physical Activity Assessment: Regular exercise      Physical Examination   Vital Signs   2018 4:30 PM CST Temperature Tympanic 97.1 DegF  LOW    Peripheral Pulse Rate 96 bpm    Systolic Blood Pressure 122 mmHg    Diastolic Blood Pressure 78 mmHg    Mean Arterial Pressure 93 mmHg    BP Site Right arm    Oxygen Saturation 98 %      Measurements from flowsheet : Measurements   2018 4:30 PM CST Height Measured - Standard 67.5 in    Weight Measured - Standard 161.8 lb    BSA 1.87 m2    Body Mass Index 24.96 kg/m2      General:  No acute distress.    Neck:  Supple, No lymphadenopathy, No thyromegaly.    Respiratory:  Lungs are clear to auscultation, Respirations are non-labored, Breath sounds are equal, Symmetrical chest wall expansion.    Cardiovascular:  Normal rate, Regular rhythm, No murmur, No gallop, Good pulses equal in all extremities, Normal peripheral perfusion, No edema.    Gastrointestinal:  Soft, Non-tender, Non-distended, Normal bowel sounds, No organomegaly.    Integumentary:  Warm, Dry, Pink.    Neurologic:   Alert, Oriented.    Psychiatric:  Cooperative, Appropriate mood & affect.

## 2022-02-16 NOTE — NURSING NOTE
Rapid Strep POC Entered On:  11/26/2019 2:41 PM CST    Performed On:  11/26/2019 2:37 PM CST by Jayla Santana CMA               Details   Collection Date :   11/26/2019 2:30 PM CST   Handling Specimen POC :   throat   POC Test Comments :   Lab Test performed by:  [facility]  [address]  [place] [address]  Phone # [phone]  Fax # [phone]  TC sent   Jayla Santana CMA - 11/26/2019 2:37 PM CST

## 2022-02-16 NOTE — TELEPHONE ENCOUNTER
---------------------  From: Jayla Santana CMA   To: Giovanni Rothman MD;     Sent: 4/14/2020 1:09:21 PM CDT  Subject: General Message     pt dropped off some more short term disability paperwork to be filled out...... Told pt it would have to wait until next week when ALFONZO is in clinic.. pt agreed.. in meantime, in looking in EMR I noticed that GJ had filled out last paperwork due to ALFONZO on vacation.... forwarded paperwork to [facility] asking is he would be willing to fill out.... GJ not willing due to not seeing the pt but only that 1 time.. He will send to [place] for them to put in ALFONZO's in box in [place]....Being that you are on telemed from home next week, I am hoping that you will stopping into the [facility] clinic to sign and go through your paperwork?

## 2022-02-16 NOTE — PROGRESS NOTES
Patient:   TYLER ANDRADE            MRN: [id number]            FIN: [id number]               Age:   55 years     Sex:  Female     :  1965   Associated Diagnoses:   Chronic back pain   Author:   Messi Rodriguez MD      Visit Information      Date of Service: 2020 04:20 pm  [facility]  [place]  Encounter#: [id number]      Primary Care Provider (PCP):  Giovanni Rothman MD    NPI# [id number]      Referring Provider:  Messi Rodriguez MD    NPI# [id number]      Chief Complaint   2020 4:35 PM CST    Short term disability ppwk - back surgery 2 years ago 2017. Was told by work comp that PCP needs to fill out - PCP is not in clinic until after ppwk due.        History of Present Illness   Back injury occurred 2015. Had physical therapy, accupuncture, injections, medial branch block and radiofrequency ablation without success. Back fusion 2017 with Dr Blevins in [place]. Surgery helped the back pain. 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 Taken off work 2019 and put on permanent light duty. Tried to get back up to 40 lbs of lifting and repetition which triggered back pain and then reduction back to 20 lbs. Lifting out in front of her caused increased pain. Pain only with certain movements    Recent surgery on right foot 2nd toe (hammertoe)      Review of Systems   No weakness in legs  No numbness or tingling  No incontinence      Health Status   Allergies:    Allergic Reactions (Selected)  No known allergies   Medications:  (Selected)   Prescriptions  Prescribed  ALPRAZolam 0.25 mg oral tablet: = 1 tab(s) ( 0.25 mg ), PO, daily, PRN: for anxiety, # 30 tab(s), 0 Refill(s), Type: Maintenance, Pharmacy: [facility], 1 tab(s) Oral daily,PRN:for anxiety  Chantix Continuing Month 1 mg oral tablet: = 1 tab(s) ( 1 mg ), Oral, bid, # 56 tab(s), 0  Refill(s), Type: Maintenance, Pharmacy: [facility], 1 tab(s) Oral bid  fluticasone 50 mcg/inh nasal spray: = 2 spray(s), nasal, bid, # 1 EA, 5 Refill(s), Type: Soft Stop, Pharmacy: [facility], 2 spray(s) Nasal bid  hydrochlorothiazide-lisinopril 12.5 mg-10 mg oral tablet: 0.5 tab(s), Oral, daily, # 45 tab(s), 1 Refill(s), Type: Maintenance, Pharmacy: [facility], 0.5 tab(s) Oral daily   Problem list:    All Problems  Allergic Rhinitis / SNOMED CT 50864760 / Confirmed  Benign essential HTN / SNOMED CT 0686867 / Confirmed  Anxiety / SNOMED CT 31433158 / Confirmed  Hypertension / ICD-9-.9 / Confirmed  Methamphetamine Abuse / ICD-9-.70 / Confirmed  Multiple pulmonary nodules / SNOMED CT 6480210489 / Confirmed  Nicotine Dependence / ICD-9-.1 / Confirmed  Raynaud's disease / ICD-9-.0 / Confirmed  Sciatica / ICD-9-.3 / Confirmed      Histories   Procedure history:    History of lumbar fusion (SNOMED CT 8342221977) performed by Thien Blevins MD on 8/30/2017 at 52 Years.  Comments:  9/21/2017 1:35 PM KAIDENT - Jannette Hyde  Day #1 - L3-4, L4-5, L5-S1 fusion.  Day #2 - L3-S1 fusion.  Carpal tunnel decompression (SNOMED CT 0914548473) in 2017 at 52 Years.  Trigger finger of right hand (SNOMED CT 500843335834423) in 2012 at 47 Years.  Tubal ligation (SNOMED CT 690652838).  cervical cryotherapy.   Social history: . Quit smoking with chantix      Physical Examination   Vital Signs   2/25/2020 4:35 PM CST Peripheral Pulse Rate 80 bpm    Pulse Site Radial artery    HR Method Manual    Systolic Blood Pressure 124 mmHg    Diastolic Blood Pressure 86 mmHg  HI    Mean Arterial Pressure 99 mmHg    BP Site Right arm    BP Method Manual      Measurements from flowsheet : Measurements   2/25/2020 4:35 PM CST Height Measured - Standard 67.5 in    Weight Measured - Standard 168 lb    BSA 1.9 m2    Body Mass Index 25.92 kg/m2  HI      General:  Alert and oriented, No acute distress.     Musculoskeletal:  Normal gait.    Neurologic:  Normal deep tendon reflexes.    Musculoskeletal: normal strength and ROM in legs. No lumbar spine tenderness  Skin: surgical scar lumbar spine      Impression and Plan   Diagnosis     Chronic back pain (BZD34-FE M54.9).       Chronic back pain: refer to [facility]. Continue 20 lbs weight restriction. Follow up in one month

## 2022-02-16 NOTE — PROGRESS NOTES
Chief Complaint    Medication refill-HCTZ-lisinopril, alprazolam and Chantix  History of Present Illness       This was a video visit on the [employer] platform due to the coronavirus epidemic. Patient consent obtained for video visit.       Start time 1640       End time 1655       Lydia is a 56-year-old with history of cigarette smoking, lung nodules, hypertension, and allergic rhinitis as well as anxiety who needs medication refills.  Her blood pressure has been well controlled she thinks.  She takes Xanax infrequently for acute episodes of anxiety.  30 tablets have lasted since December.  [place] was [place].  She has not been ill.  Review of Systems       No cough, dyspnea, fever, chills, headache, myalgia, chest pain, dyspnea.  Physical Exam       Patient is a healthy-appearing woman in no distress.  Alert and oriented.  In good spirits.  Normal affect and mood.  No increased work of breathing.  No facial asymmetry.  Assessment/Plan       Allergic Rhinitis (J30.1)         Doing well.  Using nasal steroids as needed.         Ordered:          59622 office o/p est mod 30-39 min (Charge), Quantity: 1, Multiple pulmonary nodules  Nicotine Dependence  Health care maintenance  Benign essential HTN  Allergic Rhinitis                Anxiety (F41.1)          Patient feels she is doing well.  Uses about 30.25 mg Xanax tablets per 6 months..  Refilled.                Benign essential HTN (I10)          Medication refilled.  Recommended lab work.         Ordered:          08318 office o/p est mod 30-39 min (Charge), Quantity: 1, Multiple pulmonary nodules  Nicotine Dependence  Health care maintenance  Benign essential HTN  Allergic Rhinitis                Health care maintenance (Z00.00)         Discusssed the need for health maintenance issues such as Pap smear, mammogram, colon cancer screening.  Commended scheduling a wellness visit at her convenience.         Ordered:          35213 office o/p est mod 30-39 min  (Charge), Quantity: 1, Multiple pulmonary nodules  Nicotine Dependence  Health care maintenance  Benign essential HTN  Allergic Rhinitis                Multiple pulmonary nodules (R91.8)          Does not appear she is had follow-up since 2018.         Ordered:          67047 office o/p est mod 30-39 min (Charge), Quantity: 1, Multiple pulmonary nodules  Nicotine Dependence  Health care maintenance  Benign essential HTN  Allergic Rhinitis          CT Chest w/o Contrast (Request), Multiple pulmonary nodules          Review Orders for Potential Authorizations, 06/24/21 16:48:50 CDT                Nicotine Dependence (F17.200)          Patient is interested in quitting again she is smoking a pack to three quarters of a pack.  She would like to use Chantix again.         Ordered:          19669 office o/p est mod 30-39 min (Charge), Quantity: 1, Multiple pulmonary nodules  Nicotine Dependence  Health care maintenance  Benign essential HTN  Allergic Rhinitis                Orders:         ALPRAZolam, = 1 tab(s) ( 0.25 mg ), PO, daily, PRN: for anxiety, # 30 tab(s), 0 Refill(s), Type: Maintenance, Pharmacy: [facility], 1 tab(s) Oral daily,PRN:for anxiety, 67.5, in, 02/25/20 16:35:00 CST, Height Measured, 168, lb, 02/25/20 16:35:00 CST, [place]..., (Ordered)         hydrochlorothiazide-lisinopril, 1 tab(s), Oral, daily, # 90 tab(s), 1 Refill(s), Type: Maintenance, Pharmacy: [facility], 1 tab(s) Oral daily, 67.5, in, 02/25/20 16:35:00 CST, Height Measured, 168, lb, 02/25/20 16:35:00 CST, Weight Measured, (Ordered)         varenicline, See Instructions, Instructions: TAKE ONE (1) TABLET TWICE DAILY, # 60 EA, 2 Refill(s), Type: Maintenance, Pharmacy: [facility], TAKE ONE (1) TABLET TWICE DAILY, 67.5, in, 02/25/20 16:35:00 CST, Height Measured, 168, lb, 02/25/20 16:35:00 CST,..., (Ordered)         Return to Clinic (Request), RFV: Wellness, Return in now         Return to Clinic (Request), RFV:  BMP, lipids, Return in now  Patient Information     Name:TYLER ANDRADE      Address:      [address] [id number]     Sex:Female     YOB: 1965     [phone]     Emergency Contact:JOS ANDRADE     MRN:[id number]     FIN:[id number]     Location:[facility]     Date of Service:06/24/2021      Primary Care Physician:       Stephan CASAREZ, Giovanni, [phone]      Attending Physician:       Gui Mendoza MD, [phone]  Problem List/Past Medical History    Ongoing     Allergic Rhinitis     Anxiety     Benign essential HTN     Hypertension     Methamphetamine Abuse     Multiple pulmonary nodules     Nicotine Dependence     Raynaud's disease     Sciatica    Historical     No qualifying data  Procedure/Surgical History     History of lumbar fusion (08/30/2017)      Comments: Day #1 - L3-4, L4-5, L5-S1 fusion.      Day #2 - L3-S1 fusion..     Carpal tunnel decompression (2017)     Trigger finger of right hand (2012)     cervical cryotherapy     Tubal ligation  Medications    ALPRAZolam 0.25 mg oral tablet, 0.25 mg= 1 tab(s), Oral, daily, PRN    Chantix Continuing Month 1 mg oral tablet, See Instructions, 2 refills    fluticasone 50 mcg/inh nasal spray, 2 spray(s), Nasal, bid, 5 refills    hydrochlorothiazide-lisinopril 12.5 mg-10 mg oral tablet, 1 tab(s), Oral, daily, 1 refills  Allergies    No known allergies  Social History    Smoking Status     Current every day smoker     Alcohol - Denies Alcohol Use      Past     Electronic Cigarette/Vaping      Electronic Cigarette Use: Never.     Exercise - Regular exercise     Tobacco - Current      10 or more cigarettes (1/2 pack or more)/day in last 30 days  Family History    Hypertension: Mother and Sister.  Immunizations          Scheduled Immunizations          Dose Date(s)          Td          03/01/2005          Other Immunizations          influenza virus vaccine, inactivated          01/02/2017          Td           01/01/2007          tetanus/diphth/pertuss (Tdap) adult/adol          01/02/2017

## 2022-02-16 NOTE — LETTER
(Inserted Image. Unable to display)     February 17, 2020      TYLER ANDRADE   [place], [place] [id number]          Dear TYLER,      Thank you for selecting [facility] for your healthcare needs.    Our records indicate you are due for the following services:    Follow-up office visit / Medication Check.  Hypertension check ~ please remember to bring your at-home blood pressure readings with you to your appointment.   Non-Fasting Labs.    If you had your labs done at another facility or with Direct Access Lab Testing at [facility], please bring in a copy of the results to your next visit, mail a copy, or drop off a copy of your results to your Healthcare Provider.    You are due for lab work and an office visit; please schedule the lab appointment 1 week before the office visit.  This will assure all results are available to discuss with your Healthcare Provider during your visit.    **It is very helpful if you bring your medication bottles to your appointment.  This assures we have all of your current medications, including strength and dosing information, documented accurately in your medical record.    To schedule an appointment or if you have further questions, please contact your primary clinic:   [facility]       [phone]   [facility]       [phone]              [facility]     [phone]      Powered by [employer]    Sincerely,    Giovanni Rothman MD

## 2022-02-16 NOTE — PROGRESS NOTES
Patient:   TYLER ANDRADE            MRN: [id number]            FIN: [id number]               Age:   52 years     Sex:  Female     :  1965   Associated Diagnoses:   Allergic Rhinitis; Anxiety; Benign essential HTN   Author:   Giovanni Rothman MD      Impression and Plan   Diagnosis     Allergic Rhinitis (NBR27-FH J30.1).     Course:  Progressing as expected.    Orders     Orders   Charges (Evaluation and Management):  44181 office outpatient visit 15 minutes (Charge) (Order): Quantity: 1, Degenerative disc disease, lumbar  Allergic Rhinitis  Anxiety  Benign essential HTN.     Orders (Selected)   Prescriptions  Prescribed  fluticasone 50 mcg/inh nasal spray: 1 spray(s), nasal, daily, # 1 EA, 5 Refill(s), Type: Soft Stop, Pharmacy: [facility], 1 spray(s) nasal daily.     Diagnosis     Anxiety (GJI47-WO F41.1).     Course:  Progressing as expected.    Orders     Orders (Selected)   Prescriptions  Prescribed  ALPRAZolam 0.25 mg oral tablet: 1 tab(s) ( 0.25 mg ), PO, daily, PRN: for anxiety, # 30 tab(s), 0 Refill(s), Type: Maintenance, Pharmacy: [facility], 1 tab(s) po daily,PRN:for anxiety.     Diagnosis     Benign essential HTN (VNJ43-QR I10).     Course:  Well controlled.    Orders     Orders (Selected)   Prescriptions  Prescribed  hydrochlorothiazide-lisinopril 12.5 mg-10 mg oral tablet: 1 tab(s), PO, Daily, # 90 tab(s), 1 Refill(s), Type: Maintenance, Pharmacy: [facility], 1 tab(s) po daily.        Visit Information   Visit type:  Scheduled follow-up.    Accompanied by:  No one.    Source of history:  Self.    Referral source:  Self.    History limitation:  None.       Chief Complaint   2017 11:07 AM CDT   Pt here for pre op. DOS 17 Brain and spine inst. Mark Blevins Spinal fusion        History of Present Illness             The patient presents for follow-up evaluation of hypertension.  The quality of hypertension symptom(s) since the patient's last visit is described  as being unchanged.  The severity of the hypertension symptom(s) since the last visit is moderate.  Since the patient's last visit, the timing/course of hypertension symptom(s) is constant.  Exacerbating factors consist of none.  Relieving factors consist of medication.  Associated symptoms consist of none.  Prior treatment consists of lifestyle modification (weight reduction, dietary sodium restriction, increased physical activity, adoption of DASH eating plan).  Medical encounters: none.  Compliance problems: none.               The patient presents with rhinorrhea.  The rhinorrhea is from both nostrils.  The rhinorrhea is described as clear.  The severity of the rhinorrhea is severe.  The rhinorrhea is constant and is worsening.  The context of the rhinorrhea: occurred after exposure to allergens.  Exacerbating factors consist of pollen.  Relieving factors consist of allergen avoidance and medication.  Associated symptoms consist of itchy eyes, nasal congestion, sneezing, denies fever, denies headache, denies cough, denies ear pain, denies facial pain and denies sore throat.               The patient presents with anxiety.  The anxiety is characterized by difficulty concentrating, irritability, nervousness and restlessness.  The severity of the anxiety is moderate.  The anxiety is constant.  The context of the anxiety: occurred in association with the inability to cope with stress.  Exacerbating factors consist of emotional stress and social change.  Relieving factors consist of medication and stress reduction.        Review of Systems   Constitutional:  Negative.    Eye:  Negative.    Ear/Nose/Mouth/Throat:  Negative.    Respiratory:  Negative.    Cardiovascular:  Negative.    Gastrointestinal:  Negative.    Genitourinary:  Negative.    Hematology/Lymphatics:  Negative.    Endocrine:  Negative.    Immunologic:  Negative.    Musculoskeletal:  Negative.    Integumentary:  Negative.    Neurologic:  Negative.     Psychiatric:  Negative.    All other systems reviewed and negative      Health Status   Allergies:    Allergic Reactions (Selected)  No known allergies   Medications:  (Selected)   Prescriptions  Prescribed  ALPRAZolam 0.25 mg oral tablet: 1 tab(s) ( 0.25 mg ), PO, daily, PRN: for anxiety, # 30 tab(s), 0 Refill(s), Type: Maintenance, Pharmacy: [facility], 1 tab(s) po daily,PRN:for anxiety  Chantix Starter Pack 0.5 mg-1 mg oral tablet: See Instructions, Instructions: 0.5 mg po daily for 3 days, then 0.5 mg po BID for 4 days, then 1.0 mg po BID, # 1 kit(s), 2 Refill(s), Type: Maintenance, Pharmacy: [facility], 0.5 mg po daily for 3 days, then 0.5 mg po BID for 4 days, then 1....  fluticasone 50 mcg/inh nasal spray: 1 spray(s), nasal, daily, # 1 EA, 5 Refill(s), Type: Soft Stop, Pharmacy: [facility], 1 spray(s) nasal daily  hydrochlorothiazide-lisinopril 12.5 mg-10 mg oral tablet: 1 tab(s), PO, Daily, # 90 tab(s), 1 Refill(s), Type: Maintenance, Pharmacy: [facility], 1 tab(s) po daily   Problem list:    All Problems  Allergic Rhinitis / SNOMED CT 45525539 / Confirmed  Anxiety / SNOMED CT 82329990 / Confirmed  Benign essential HTN / SNOMED CT 4210499 / Confirmed  Hypertension / ICD-9-.9 / Confirmed  Methamphetamine Abuse / ICD-9-.70 / Confirmed  Nicotine Dependence / ICD-9-.1 / Confirmed  Raynaud's disease / ICD-9-.0 / Confirmed  Sciatica / ICD-9-.3 / Confirmed      Histories   Past Medical History:    Active  Hypertension (401.9)  Anxiety (43976726)  Sciatica (724.3)  Raynaud's disease (443.0)  Allergic Rhinitis (10127107)   Family History:    Hypertension  Mother ()  Sister     Procedure history:    Carpal tunnel decompression (SNOMED CT 6945323824) in 2017 at 52 Years.  Trigger finger of right hand (SNOMED CT 830850725204478) in 2012 at 47 Years.  Tubal ligation (SNOMED CT 088039867).  cervical cryotherapy.   Social History:        Alcohol  Assessment: Current            Current, Beer                     Comments:                      08/10/2010 - Roxi Canales                     occ      Tobacco Assessment: Current            Current, Cigarettes, 20 per day.      Exercise and Physical Activity Assessment: Regular exercise        Physical Examination   Vital Signs   8/16/2017 11:07 AM CDT Peripheral Pulse Rate 85 bpm    Pulse Site Radial artery    Systolic Blood Pressure 122 mmHg    Diastolic Blood Pressure 66 mmHg    Mean Arterial Pressure 85 mmHg    BP Site Right arm    BP Method Manual    Oxygen Saturation 99 %      Measurements from flowsheet : Measurements   8/16/2017 11:07 AM CDT Height Measured - Standard 67.5 in    Weight Measured - Standard 156.6 lb    BSA 1.84 m2    Body Mass Index 24.16 kg/m2      General:  No acute distress.    Neck:  Supple, No lymphadenopathy, No thyromegaly.    Respiratory:  Lungs are clear to auscultation, Respirations are non-labored, Breath sounds are equal, Symmetrical chest wall expansion.    Cardiovascular:  Normal rate, Regular rhythm, No murmur, No gallop, Good pulses equal in all extremities, Normal peripheral perfusion, No edema.    Gastrointestinal:  Soft, Non-tender, Non-distended, Normal bowel sounds, No organomegaly.    Integumentary:  Warm, Dry, Pink.    Neurologic:  Alert, Oriented.    Psychiatric:  Cooperative, Appropriate mood & affect.

## 2022-02-16 NOTE — PROGRESS NOTES
Patient:   TYLER ANDRADE            MRN: [id number]            FIN: [id number]               Age:   52 years     Sex:  Female     :  1965   Associated Diagnoses:   Degenerative disc disease, lumbar   Author:   Giovanni Rothman MD      Impression and Plan   Diagnosis     Degenerative disc disease, lumbar (NHW83-MR M51.36).     Condition:  Stable, no contraindication for general anesthesia or surgical procedure..    Orders     Orders   Charges (Evaluation and Management):  33439 office outpatient visit 25 minutes (Charge) (Order): Quantity: 1, Degenerative disc disease, lumbar.     Orders (Selected)   Outpatient Orders  Ordered (Dispatched)  CBC (h/h, RBC, indices, WBC, Plt)* ([facility]): Specimen Type: Blood, Collection Date: 17 11:33:00 CDT  Comprehensive Metabolic Panel* ([facility]): Specimen Type: Serum, Collection Date: 17 11:33:00 CDT  Completed   ekg interpret + report preve (Charge): Quantity: 1, Degenerative disc disease, lumbar.        Preoperative Information   Indication for surgery:  Musculoskeletal disorder.         Associated symptoms: Lumbar pain and radiculopathy not responsive to conservative treatment.    Accompanied by:  No one.    Source of history:  Self.    History limitation:  None.       Chief Complaint   Chief complaint discussed and confirmed correct.     2017 11:07 AM CDT   Pt here for pre op. DOS 17 Brain and spine inst. Mark Blevins Spinal fusion        Review of Systems   Constitutional:  Negative.    Eye:  Negative.    Ear/Nose/Mouth/Throat:  Negative.    Respiratory:  Negative.    Cardiovascular:  Negative.    Gastrointestinal:  Negative.    Genitourinary:  Negative.    Hematology/Lymphatics:  Negative.    Endocrine:  Negative.    Immunologic:  Negative.    Musculoskeletal:       Back pain: Bilaterally, In the lower region.    Integumentary:  Negative.    Neurologic:  Negative.    Psychiatric:  Negative.    All other systems reviewed and negative   No  personal or family history of anesthesia reactions  No history of bleeding or blood clotting disorders  No history of heart murmur or need for prophylactic antibiotics  No history of blood transfusion  No history of recent infectious contacts       Health Status   Allergies:    Allergic Reactions (Selected)  No known allergies   Medications:  (Selected)   Prescriptions  Prescribed  ALPRAZolam 0.25 mg oral tablet: 1 tab(s) ( 0.25 mg ), PO, daily, PRN: for anxiety, # 30 tab(s), 0 Refill(s), Type: Maintenance, Pharmacy: [facility], 1 tab(s) po daily,PRN:for anxiety  Chantix Starter Pack 0.5 mg-1 mg oral tablet: See Instructions, Instructions: 0.5 mg po daily for 3 days, then 0.5 mg po BID for 4 days, then 1.0 mg po BID, # 1 kit(s), 2 Refill(s), Type: Maintenance, Pharmacy: [facility], 0.5 mg po daily for 3 days, then 0.5 mg po BID for 4 days, then 1....  fluticasone 50 mcg/inh nasal spray: 1 spray(s), nasal, daily, # 1 EA, 5 Refill(s), Type: Soft Stop, Pharmacy: [facility], 1 spray(s) nasal daily  hydrochlorothiazide-lisinopril 12.5 mg-10 mg oral tablet: 1 tab(s), PO, Daily, # 90 tab(s), 1 Refill(s), Type: Maintenance, Pharmacy: [facility], 1 tab(s) po daily   Problem list:    All Problems  Allergic Rhinitis / SNOMED CT 05857934 / Confirmed  Anxiety / SNOMED CT 13178769 / Confirmed  Benign essential HTN / SNOMED CT 2679851 / Confirmed  Hypertension / ICD-9-.9 / Confirmed  Methamphetamine Abuse / ICD-9-.70 / Confirmed  Nicotine Dependence / ICD-9-.1 / Confirmed  Raynaud's disease / ICD-9-.0 / Confirmed  Sciatica / ICD-9-.3 / Confirmed      Histories   Past Medical History:    Active  Hypertension (401.9)  Anxiety (28908550)  Sciatica (724.3)  Raynaud's disease (443.0)  Allergic Rhinitis (55944517)   Family History:    Hypertension  Mother ()  Sister     Procedure history:    Carpal tunnel decompression (SNOMED CT 5694882083) in 2017 at 52  Years.  Trigger finger of right hand (SNOMED CT 306535472451310) in 2012 at 47 Years.  Tubal ligation (SNOMED CT 728739788).  cervical cryotherapy.   Social History:        Alcohol Assessment: Current            Current, Beer                     Comments:                      08/10/2010 - [place] Roxi                     occ      Tobacco Assessment: Current            Current, Cigarettes, 20 per day.      Exercise and Physical Activity Assessment: Regular exercise        Physical Examination   Vital signs reviewed  and within acceptable limits    Vital Signs   8/16/2017 11:07 AM CDT Peripheral Pulse Rate 85 bpm    Pulse Site Radial artery    Systolic Blood Pressure 122 mmHg    Diastolic Blood Pressure 66 mmHg    Mean Arterial Pressure 85 mmHg    BP Site Right arm    BP Method Manual    Oxygen Saturation 99 %      Measurements from flowsheet : Measurements   8/16/2017 11:07 AM CDT Height Measured - Standard 67.5 in    Weight Measured - Standard 156.6 lb    BSA 1.84 m2    Body Mass Index 24.16 kg/m2      General:  Alert and oriented, No acute distress.    Eye:  Pupils are equal, round and reactive to light, Extraocular movements are intact, Normal conjunctiva.    HENT:  Normocephalic, Tympanic membranes are clear, Normal hearing, Oral mucosa is moist, No pharyngeal erythema.    Neck:  Supple, Non-tender, No carotid bruit, No jugular venous distention, No lymphadenopathy, No thyromegaly.    Respiratory:  Lungs are clear to auscultation, Respirations are non-labored, Breath sounds are equal, Symmetrical chest wall expansion, No chest wall tenderness.    Cardiovascular:  Normal rate, Regular rhythm, No murmur, No gallop, Good pulses equal in all extremities, Normal peripheral perfusion, No edema.    Gastrointestinal:  Soft, Non-tender, Non-distended, Normal bowel sounds, No organomegaly.    Lymphatics:  No lymphadenopathy neck, axilla, groin.    Musculoskeletal:  Normal range of motion, Normal strength, No tenderness, No  swelling, No deformity, Normal gait.    Integumentary:  Warm, Dry, Pink.    Neurologic:  Normal sensory, Normal motor function, No focal deficits, Cranial Nerves II-XII are grossly intact, Normal deep tendon reflexes.    Psychiatric:  Cooperative, Appropriate mood & affect, Normal judgment.

## 2022-02-16 NOTE — LETTER
(Inserted Image. Unable to display)   June 28, 2021    TYLER ANDRADE  [address] [place] [address]            Dear TYLER,      Thank you for selecting [facility] for your healthcare needs.    Our records indicate you are due for the following services:    Annual Physical.    Fasting Lab Tests ~ Please do not eat or drink anything 10 hours prior to your scheduled appointment time.  (Water and any medications that you may need are allowed unless directed otherwise.)    If you had your labs done at another facility or with Direct Access Lab Testing at [facility], please bring in a copy of the results to your next visit, mail a copy, or drop off a copy of your results to your Healthcare Provider.    (FYI   Regarding office visits: In some instances, a video visit or telephone visit may be offered as an option.)    You are due for lab work and an office visit; please schedule the lab appointment 1 week before the office visit.  This will assure all results are available to discuss with your Healthcare Provider during your visit.    **It is very helpful if you bring your medication bottles to your appointment.  This assures we have all of your current medications, including strength and dosing information, documented accurately in your medical record.    To schedule an appointment or if you have further questions, please contact your clinic at [phone].      Powered by [employer]    Sincerely,    Gui Mendoza MD, FACP

## 2022-02-16 NOTE — PROGRESS NOTES
Patient:   TYLER ANDRADE            MRN: [id number]            FIN: [id number]               Age:   52 years     Sex:  Female     :  1965   Associated Diagnoses:   Diverticulitis   Author:   Giovanni Rothman MD      Impression and Plan   Diagnosis     Diverticulitis (AMA69-IB K57.32).     Course:  Worsening.    Plan:  follow up in 3-4 days if not significantly improved - sooner if getting worse.    Orders     Orders   Charges (Evaluation and Management):  85596 office outpatient visit 15 minutes (Charge) (Order): Quantity: 1, Diverticulitis.     Orders (Selected)   Prescriptions  Prescribed  Augmentin 875 mg-125 mg oral tablet: 1 tab(s), po, bidac, # 20 tab(s), 0 Refill(s), Type: Maintenance, Pharmacy: [facility], 1 tab(s) po bidac,x10 day(s)  metroNIDAZOLE 500 mg oral tablet: 1 tab(s) ( 500 mg ), PO, q12 hrs, # 20 tab(s), 0 Refill(s), Type: Maintenance, Pharmacy: [facility], 1 tab(s) po q12 hrs,x10 day(s).        Visit Information   Visit type:  New symptom.    Accompanied by:  No one.    Source of history:  Self.    History limitation:  None.       Chief Complaint   2018 3:04 PM CST    Pt here for sharp side pain        History of Present Illness             The patient presents with abdominal pain.  The abdominal pain is localized and located in the left lower quadrant.  The abdominal pain is described as sharp.  The severity of the abdominal pain is moderate.  The abdominal pain is episodic and fluctuates in intensity.  The abdominal pain has lasted for 2 week(s).  Radiation of pain: none.  There are no modifying factors.  Associated symptoms consist of No blood in urine or stool.  No fever or chills.  No weight loss, denies fever, denies nausea, denies vomiting, denies diarrhea, denies constipation, denies abdominal distention and denies dysuria.        Review of Systems   Constitutional:  Negative.    Eye:  Negative.    Ear/Nose/Mouth/Throat:  Negative.    Respiratory:  Negative.     Cardiovascular:  Negative.    Gastrointestinal:  No nausea, No vomiting, No diarrhea, No constipation, No heartburn, No belching, No bloating, No hematemesis, No abdominal distention, No change in bowel habits, No change in stool color, No change in stool consistency, No fecal incontinence, No dysphagia, No hemorrhoids, No loss of appetite, No jaundice, No melena, Not passing flatus, No rectal pain        Last bowel movement: Today.         Rectal bleeding: None.    Genitourinary:  No dysuria, No hematuria, No change in urine stream, No urethral discharge.    Gynecologic:       Vaginal discharge: None.    Hematology/Lymphatics:  Negative.    Endocrine:  Negative.    Immunologic:  Negative.    Musculoskeletal:  Negative.    Integumentary:  Negative.    Neurologic:  Negative.    Psychiatric:  Negative.    All other systems reviewed and negative      Health Status   Allergies:    Allergic Reactions (Selected)  No known allergies   Medications:  (Selected)   Prescriptions  Prescribed  ALPRAZolam 0.25 mg oral tablet: 1 tab(s) ( 0.25 mg ), PO, daily, PRN: for anxiety, # 30 tab(s), 0 Refill(s), Type: Maintenance, Pharmacy: [facility], 1 tab(s) po daily,PRN:for anxiety  Augmentin 875 mg-125 mg oral tablet: 1 tab(s), po, bidac, # 20 tab(s), 0 Refill(s), Type: Maintenance, Pharmacy: [facility], 1 tab(s) po bidac,x10 day(s)  fluticasone 50 mcg/inh nasal spray: 1 spray(s), nasal, daily, # 1 EA, 5 Refill(s), Type: Soft Stop, Pharmacy: [facility], 1 spray(s) nasal daily  hydrochlorothiazide-lisinopril 12.5 mg-10 mg oral tablet: 1 tab(s), PO, Daily, # 90 tab(s), 1 Refill(s), Type: Maintenance, Pharmacy: [facility], 1 tab(s) po daily  metroNIDAZOLE 500 mg oral tablet: 1 tab(s) ( 500 mg ), PO, q12 hrs, # 20 tab(s), 0 Refill(s), Type: Maintenance, Pharmacy: [facility], 1 tab(s) po q12 hrs,x10 day(s)   Problem list:    All Problems  Allergic Rhinitis / SNOMED CT 59577044 / Confirmed  Anxiety /  SNOMED CT 74440766 / Confirmed  Benign essential HTN / SNOMED CT 5300061 / Confirmed  Hypertension / ICD-9-.9 / Confirmed  Methamphetamine Abuse / ICD-9-.70 / Confirmed  Nicotine Dependence / ICD-9-.1 / Confirmed  Raynaud's disease / ICD-9-.0 / Confirmed  Sciatica / ICD-9-.3 / Confirmed      Histories   Past Medical History:    Active  Hypertension (401.9)  Anxiety (34160946)  Sciatica (724.3)  Raynaud's disease (443.0)  Allergic Rhinitis (93681709)   Family History:    Hypertension  Mother ()  Sister     Procedure history:    History of lumbar fusion (SNOMED CT 8884846288) performed by Thien Blevins MD on 2017 at 52 Years.  Comments:  2017 1:35 PM - Jannette Hyde  Day #1 - L3-4, L4-5, L5-S1 fusion.  Day #2 - L3-S1 fusion.  Carpal tunnel decompression (SNOMED CT 4916059822) in 2017 at 52 Years.  Trigger finger of right hand (SNOMED CT 157041248499424) in  at 47 Years.  Tubal ligation (SNOMED CT 362422777).  cervical cryotherapy.      Physical Examination   Vital Signs   2018 3:04 PM CST Temperature Tympanic 97.4 DegF  LOW    Peripheral Pulse Rate 90 bpm    Systolic Blood Pressure 118 mmHg    Diastolic Blood Pressure 74 mmHg    Mean Arterial Pressure 89 mmHg    BP Site Right arm    Oxygen Saturation 99 %      Measurements from flowsheet : Measurements   2018 3:04 PM CST Height Measured - Standard 67.5 in    Weight Measured - Standard 161 lb    BSA 1.86 m2    Body Mass Index 24.84 kg/m2      General:  No acute distress.    Neck:  Supple, No lymphadenopathy, No thyromegaly.    Respiratory:  Lungs are clear to auscultation, Respirations are non-labored, Breath sounds are equal, Symmetrical chest wall expansion.    Cardiovascular:  Normal rate, Regular rhythm, No murmur, No gallop, Good pulses equal in all extremities, Normal peripheral perfusion, No edema.    Gastrointestinal:  Soft, Non-distended, Normal bowel sounds, No organomegaly, No gaurding or rebound  or percussion tenderness, mild to moderate point tenderness in LLQ.    Genitourinary:  No costovertebral angle tenderness.    Musculoskeletal:  Normal gait.    Integumentary:  Warm, Dry, Pink.    Neurologic:  Alert, Oriented.    Psychiatric:  Cooperative, Appropriate mood & affect.

## 2022-02-16 NOTE — LETTER
(Inserted Image. Unable to display)   August 18, 2020        TYLER GARCIAYNOR   [place], [place] [id number]        Dear TYLER,      Thank you for selecting [facility] for your healthcare needs.    Our records indicate you are due for the following services:    Hypertension check ~ please remember to bring your at-home blood pressure readings with you to your appointment.   Non-Fasting Labs    If you had your labs done at another facility or with [facility], please bring in a copy of the results to your next visit, mail a copy, or drop off a copy of your results to your Healthcare Provider.    You are due for lab work and an office visit, please schedule the lab appointment 1 week before the office visit.  This will assure all results are available to discuss with your provider during your visit.    **It is very helpful if you bring your medication bottles to your appointment.  This assures we have all of your current medications, including strength and dosing information, documented accurately in your medical record.    To schedule an appointment or if you have further questions, please contact your primary clinic:   formerly [facility]       [phone]   [facility]       [phone]              [facility]     [phone]      Powered by [employer]    Sincerely,    Giovanni Rothman M.D.

## 2022-02-16 NOTE — PROGRESS NOTES
Patient:   TYLER ANDRADE            MRN: [id number]            FIN: [id number]               Age:   54 years     Sex:  Female     :  1965   Associated Diagnoses:   Allergic Rhinitis; Anxiety; Benign essential HTN   Author:   Giovanni Rothman MD      Impression and Plan   Diagnosis     Allergic Rhinitis (FJB38-WO J30.1).     Course:  Progressing as expected.    Orders     Orders   Charges (Evaluation and Management):  64586 office outpatient visit 25 minutes (Charge) (Order): Quantity: 1, Allergic Rhinitis  Benign essential HTN  Anxiety.     Orders (Selected)   Prescriptions  Prescribed  fluticasone 50 mcg/inh nasal spray: = 2 spray(s), nasal, bid, # 1 EA, 5 Refill(s), Type: Soft Stop, Pharmacy: [facility], 2 spray(s) Nasal bid.     Diagnosis     Anxiety (VPL26-DQ F41.1).     Course:  Progressing as expected.    Orders     Orders (Selected)   Prescriptions  Prescribed  ALPRAZolam 0.25 mg oral tablet: = 1 tab(s) ( 0.25 mg ), PO, daily, PRN: for anxiety, # 30 tab(s), 0 Refill(s), Type: Maintenance, Pharmacy: [facility], 1 tab(s) Oral daily,PRN:for anxiety.     Diagnosis     Benign essential HTN (MIP67-DF I10).     Course:  Well controlled.    Orders     Orders (Selected)   Prescriptions  Prescribed  hydrochlorothiazide-lisinopril 12.5 mg-10 mg oral tablet: 0.5 tab(s), Oral, daily, Instructions: due for med appt, # 45 tab(s), 1 Refill(s), Type: Maintenance, Pharmacy: [facility], 0.5 tab(s) Oral daily,Instr:due for med appt.        Visit Information      Date of Service: 08/15/2019 12:56 pm  Performing Location: [facility]  Encounter#: [id number]      Primary Care Provider (PCP):  Giovanni Rothman MD    NPI# [id number]   Visit type:  Scheduled follow-up.    Accompanied by:  No one.    Source of history:  Self.    Referral source:  Self.    History limitation:  None.       Chief Complaint   8/15/2019 12:59 PM CDT   Pt here for med ck        History of Present Illness              The patient presents for follow-up evaluation of hypertension.  The quality of hypertension symptom(s) since the patient's last visit is described as being unchanged.  The severity of the hypertension symptom(s) since the last visit is moderate.  Since the patient's last visit, the timing/course of hypertension symptom(s) is constant.  Exacerbating factors consist of none.  Relieving factors consist of medication.  Associated symptoms consist of none.  Prior treatment consists of lifestyle modification (weight reduction, dietary sodium restriction, increased physical activity, adoption of DASH eating plan).  Medical encounters: none.  Compliance problems: none.               The patient presents with rhinorrhea.  The rhinorrhea is from both nostrils.  The rhinorrhea is described as clear.  The severity of the rhinorrhea is severe.  The rhinorrhea is constant and is worsening.  The context of the rhinorrhea: occurred after exposure to allergens.  Exacerbating factors consist of pollen.  Relieving factors consist of allergen avoidance and medication.  Associated symptoms consist of itchy eyes, nasal congestion, sneezing, denies fever, denies headache, denies cough, denies ear pain, denies facial pain and denies sore throat.               The patient presents with anxiety.  The anxiety is characterized by difficulty concentrating, irritability, nervousness and restlessness.  The severity of the anxiety is moderate.  The anxiety is constant.  The context of the anxiety: occurred in association with the inability to cope with stress.  Exacerbating factors consist of emotional stress and social change.  Relieving factors consist of medication and stress reduction.        Review of Systems   Constitutional:  Negative.    Eye:  Negative.    Ear/Nose/Mouth/Throat:  Negative.    Respiratory:  Negative.    Cardiovascular:  Negative.    Gastrointestinal:  Negative.    Genitourinary:  Negative.    Hematology/Lymphatics:   Negative.    Endocrine:  Negative.    Immunologic:  Negative.    Musculoskeletal:  Negative.    Integumentary:  Negative.    Neurologic:  Negative.    Psychiatric:  Negative.    All other systems reviewed and negative      Health Status   Allergies:    Allergic Reactions (Selected)  No known allergies   Medications:  (Selected)   Prescriptions  Prescribed  ALPRAZolam 0.25 mg oral tablet: = 1 tab(s) ( 0.25 mg ), PO, daily, PRN: for anxiety, # 30 tab(s), 0 Refill(s), Type: Maintenance, Pharmacy: [facility], 1 tab(s) Oral daily,PRN:for anxiety  fluticasone 50 mcg/inh nasal spray: = 2 spray(s), nasal, bid, # 1 EA, 5 Refill(s), Type: Soft Stop, Pharmacy: [facility], 2 spray(s) Nasal bid  hydrochlorothiazide-lisinopril 12.5 mg-10 mg oral tablet: 0.5 tab(s), Oral, daily, Instructions: due for med appt, # 45 tab(s), 1 Refill(s), Type: Maintenance, Pharmacy: [facility], 0.5 tab(s) Oral daily,Instr:due for med appt   Problem list:    All Problems  Allergic Rhinitis / SNOMED CT 70810829 / Confirmed  Anxiety / SNOMED CT 70493527 / Confirmed  Benign essential HTN / SNOMED CT 6615717 / Confirmed  Hypertension / ICD-9-.9 / Confirmed  Methamphetamine Abuse / ICD-9-.70 / Confirmed  Multiple pulmonary nodules / SNOMED CT 5365586562 / Confirmed  Nicotine Dependence / ICD-9-.1 / Confirmed  Raynaud's disease / ICD-9-.0 / Confirmed  Sciatica / ICD-9-.3 / Confirmed      Histories   Past Medical History:    Active  Hypertension (401.9)  Anxiety (43509858)  Sciatica (724.3)  Raynaud's disease (443.0)  Allergic Rhinitis (98998829)   Family History:    Hypertension  Mother ()  Sister     Procedure history:    History of lumbar fusion (SNOMED CT 9242758506) performed by Thien Blevins MD on 2017 at 52 Years.  Comments:  2017 1:35 PM CDT - Jannette Hyde  Day #1 - L3-4, L4-5, L5-S1 fusion.  Day #2 - L3-S1 fusion.  Carpal tunnel decompression (SNOMED CT 2155616343) in 2017 at  52 Years.  Trigger finger of right hand (SNOMED CT 257180937940877) in 2012 at 47 Years.  Tubal ligation (SNOMED CT 146526325).  cervical cryotherapy.   Social History:        Alcohol Assessment: Denies Alcohol Use            Past      Tobacco Assessment: Current            Current, Cigarettes, 20 per day.      Exercise and Physical Activity Assessment: Regular exercise        Physical Examination   Vital Signs   8/15/2019 12:59 PM CDT Peripheral Pulse Rate 87 bpm    Systolic Blood Pressure 122 mmHg    Diastolic Blood Pressure 88 mmHg  HI    Mean Arterial Pressure 99 mmHg    Oxygen Saturation 99 %   8/14/2019 1:19 PM CDT Peripheral Pulse Rate 89 bpm    Systolic Blood Pressure 124 mmHg    Diastolic Blood Pressure 76 mmHg    Mean Arterial Pressure 92 mmHg      Measurements from flowsheet : Measurements   8/15/2019 12:59 PM CDT Height Measured - Standard 67.5 in    Weight Measured - Standard 156.2 lb    BSA 1.84 m2    Body Mass Index 24.1 kg/m2      General:  No acute distress.    Neck:  Supple, No lymphadenopathy, No thyromegaly.    Respiratory:  Lungs are clear to auscultation, Respirations are non-labored, Breath sounds are equal, Symmetrical chest wall expansion.    Cardiovascular:  Normal rate, Regular rhythm, No murmur, No gallop, Good pulses equal in all extremities, Normal peripheral perfusion, No edema.    Gastrointestinal:  Soft, Non-tender, Non-distended, Normal bowel sounds, No organomegaly.    Integumentary:  Warm, Dry, Pink.    Neurologic:  Alert, Oriented.    Psychiatric:  Cooperative, Appropriate mood & affect.       Review / Management   Results review:  UA: positive only for trace LE.

## 2022-02-16 NOTE — NURSING NOTE
Comprehensive Intake Entered On:  2/25/2020 4:43 PM CST    Performed On:  2/25/2020 4:35 PM CST by Lauren Ernandez CMA               Summary   Chief Complaint :   Short term disability ppwk - back surgery 2 years ago 8/2017. Was told by work comp that PCP needs to fill out - PCP is not in clinic until after ppwk due.   Weight Measured :   168 lb(Converted to: 168 lb 0 oz, 76.20 kg)    Height Measured :   67.5 in(Converted to: 5 ft 7 in, 171.45 cm)    Body Mass Index :   25.92 kg/m2 (HI)    Body Surface Area :   1.9 m2   Systolic Blood Pressure :   124 mmHg   Diastolic Blood Pressure :   86 mmHg (HI)    Mean Arterial Pressure :   99 mmHg   Peripheral Pulse Rate :   80 bpm   BP Site :   Right arm   Pulse Site :   Radial artery   BP Method :   Manual   HR Method :   Manual   Lauren Ernandez CMA - 2/25/2020 4:35 PM CST   Health Status   Allergies Verified? :   Yes   Medication History Verified? :   Yes   Pre-Visit Planning Status :   Completed   Lauren Ernandez CMA - 2/25/2020 4:35 PM CST   Meds / Allergies   (As Of: 2/25/2020 4:43:32 PM CST)   Allergies (Active)   No known allergies  Estimated Onset Date:   Unspecified ; Created By:   Lydia Ruvalcaba; Reaction Status:   Active ; Category:   Drug ; Substance:   No known allergies ; Type:   Allergy ; Updated By:   Lydia Ruvalcaba; Reviewed Date:   11/26/2019 2:29 PM CST        Medication List   (As Of: 2/25/2020 4:43:32 PM CST)   Prescription/Discharge Order    ALPRAZolam  :   ALPRAZolam ; Status:   Prescribed ; Ordered As Mnemonic:   ALPRAZolam 0.25 mg oral tablet ; Simple Display Line:   0.25 mg, 1 tab(s), PO, daily, PRN: for anxiety, 30 tab(s), 0 Refill(s) ; Ordering Provider:   Giovanni Rothman MD; Catalog Code:   ALPRAZolam ; Order Dt/Tm:   8/15/2019 1:20:20 PM CDT          amoxicillin  :   amoxicillin ; Status:   Processing ; Ordered As Mnemonic:   amoxicillin 500 mg oral tablet ; Ordering Provider:   Giovanni Rothman MD; Action Display:   Complete ; Catalog Code:    amoxicillin ; Order Dt/Tm:   2/25/2020 4:41:17 PM CST          fluticasone nasal  :   fluticasone nasal ; Status:   Prescribed ; Ordered As Mnemonic:   fluticasone 50 mcg/inh nasal spray ; Simple Display Line:   2 spray(s), nasal, bid, 1 EA, 5 Refill(s) ; Ordering Provider:   Giovanni Rothman MD; Catalog Code:   fluticasone nasal ; Order Dt/Tm:   8/15/2019 1:19:59 PM CDT          hydrochlorothiazide-lisinopril  :   hydrochlorothiazide-lisinopril ; Status:   Prescribed ; Ordered As Mnemonic:   hydrochlorothiazide-lisinopril 12.5 mg-10 mg oral tablet ; Simple Display Line:   0.5 tab(s), Oral, daily, 45 tab(s), 1 Refill(s) ; Ordering Provider:   Giovanni Rothman MD; Catalog Code:   hydrochlorothiazide-lisinopril ; Order Dt/Tm:   2/17/2020 10:17:01 AM CST          varenicline  :   varenicline ; Status:   Prescribed ; Ordered As Mnemonic:   Chantix Continuing Month 1 mg oral tablet ; Simple Display Line:   1 mg, 1 tab(s), Oral, bid, 56 tab(s), 0 Refill(s) ; Ordering Provider:   Giovanni Rothman MD; Catalog Code:   varenicline ; Order Dt/Tm:   12/5/2019 8:36:34 AM CST

## 2022-02-16 NOTE — PROGRESS NOTES
Patient:   TYLER ANDRADE            MRN: [id number]            FIN: [id number]               Age:   51 years     Sex:  Female     :  1965   Associated Diagnoses:   Well adult exam; Benign essential HTN; Nicotine Dependence   Author:   Giovanni Rothman MD      Impression and Plan   Diagnosis     Well adult exam (QWQ07-VX Z00.00).     Course:  Progressing as expected.    Plan:    Cancer Screening:PAP and MAMMO  Immunizations: Adacel and Flu vac  Laboratory screening: none.    Orders     Orders   Charges (Evaluation and Management):  88459 periodic preventive med est patient 40-64yrs (Charge) (Order): Quantity: 1, Well adult exam.       Orders (Selected)   Outpatient Orders  Order  Pap Smear (Request): Well adult exam.       Diagnosis     Benign essential HTN (WFZ22-EV I10).     Course:  Progressing as expected.    Orders     Orders (Selected)   Prescriptions  Prescribed  hydrochlorothiazide-lisinopril 12.5 mg-10 mg oral tablet: 1 tab(s), PO, Daily, # 90 tab(s), 1 Refill(s), Type: Maintenance, Pharmacy: [facility], 1 tab(s) po daily.       Diagnosis     Nicotine Dependence (TBB33-YF F17.200).     Course:  Worsening.    Orders     Orders (Selected)   Prescriptions  Prescribed  Chantix Starter Pack 0.5 mg-1 mg oral tablet: See Instructions, Instructions: 0.5 mg po daily for 3 days, then 0.5 mg po BID for 4 days, then 1.0 mg po BID, # 1 kit(s), 2 Refill(s), Type: Maintenance, Pharmacy: [facility], 0.5 mg po daily for 3 days, then 0.5 mg po BID for 4 days, then 1.....          Visit Information      Date of Service: 2017 11:40 am  Performing Location: [facility]  Encounter#: [id number]      Primary Care Provider (PCP):  Giovanni Rothman MD    NPI# [id number]   Visit type:  Annual exam.    Accompanied by:  No one.    Source of history:  Self.    History limitation:  None.       Chief Complaint   Chief complaint discussed and confirmed correct.     2017 11:45 AM CST    Pap and  physical      Well Adult History   Well Adult History             The patient presents for well adult exam.  The patient's general health status is described as good.  The patient's diet is described as balanced.  Exercise: occasional.  Associated symptoms consist of none.  Medical encounters: none.  Compliance problems: none.        Review of Systems   Constitutional:  Negative.    Eye:  Negative.    Ear/Nose/Mouth/Throat:  Negative.    Respiratory:  Negative.    Cardiovascular:  Negative.    Gastrointestinal:  Negative.    Genitourinary:  Negative.    Hematology/Lymphatics:  Negative.    Endocrine:  Negative.    Immunologic:  Negative.    Musculoskeletal:  Negative.    Integumentary:  Negative.    Neurologic:  Negative.    Psychiatric:  Negative.    All other systems reviewed and negative      Health Status   Allergies:    Allergic Reactions (Selected)  No known allergies   Medications:  (Selected)   Prescriptions  Prescribed  ALPRAZolam 0.25 mg oral tablet: 1 tab(s) ( 0.25 mg ), PO, daily, PRN: for anxiety, # 30 tab(s), 0 Refill(s), Type: Maintenance  Chantix Starter Pack 0.5 mg-1 mg oral tablet: See Instructions, Instructions: 0.5 mg po daily for 3 days, then 0.5 mg po BID for 4 days, then 1.0 mg po BID, # 1 kit(s), 2 Refill(s), Type: Maintenance, Pharmacy: [facility], 0.5 mg po daily for 3 days, then 0.5 mg po BID for 4 days, then 1....  fluticasone 50 mcg/inh nasal spray: 1 spray(s), nasal, daily, # 1 EA, 5 Refill(s), Type: Soft Stop, Pharmacy: [facility], 1 spray(s) nasal daily  hydrochlorothiazide-lisinopril 12.5 mg-10 mg oral tablet: 1 tab(s), PO, Daily, # 90 tab(s), 1 Refill(s), Type: Maintenance, Pharmacy: [facility], 1 tab(s) po daily   Problem list:    All Problems  Allergic Rhinitis / SNOMED CT 71659406 / Confirmed  Anxiety / SNOMED CT 75217889 / Confirmed  Benign essential HTN / SNOMED CT 2997676 / Confirmed  Hypertension / ICD-9-.9 / Confirmed  Methamphetamine Abuse /  ICD-9-.70 / Confirmed  Nicotine Dependence / ICD-9-.1 / Confirmed  Raynaud's disease / ICD-9-.0 / Confirmed  Sciatica / ICD-9-.3 / Confirmed      Histories   Past Medical History:    Active  Hypertension (401.9)  Anxiety (36412454)  Sciatica (724.3)  Raynaud's disease (443.0)  Allergic Rhinitis (50655059)   Family History:    Hypertension  Mother ()  Sister     Procedure history:    Tubal ligation (SNOMED CT 697048602).  cervical cryotherapy.   Social History:        Alcohol Assessment: Current            Current, Beer                     Comments:                      08/10/2010 - Roxi Canales      Tobacco Assessment: Current            Current, Cigarettes, 20 per day.      Exercise and Physical Activity Assessment: Regular exercise        Physical Examination   Vital signs reviewed  and within acceptable limits    Vital Signs   2017 11:45 AM CST Temperature Tympanic 96.0 DegF  LOW    Peripheral Pulse Rate 68 bpm    Systolic Blood Pressure 116 mmHg    Diastolic Blood Pressure 76 mmHg    Mean Arterial Pressure 89 mmHg      Measurements from flowsheet : Measurements   2017 11:45 AM CST Height Measured - Standard 67.5 in    Weight Measured - Standard 159.8 lb    BSA 1.86 m2    Body Mass Index 24.66 kg/m2      General:  Alert and oriented, No acute distress.    Eye:  Pupils are equal, round and reactive to light, Extraocular movements are intact, Normal conjunctiva.    HENT:  Normocephalic, Tympanic membranes are clear, Normal hearing, Oral mucosa is moist, No pharyngeal erythema.    Neck:  Supple, Non-tender, No carotid bruit, No jugular venous distention, No lymphadenopathy, No thyromegaly.    Respiratory:  Lungs are clear to auscultation, Respirations are non-labored, Breath sounds are equal, Symmetrical chest wall expansion, No chest wall tenderness.    Cardiovascular:  Normal rate, Regular rhythm, No murmur, No gallop, Good pulses equal in all  extremities, Normal peripheral perfusion, No edema.    Gastrointestinal:  Soft, Non-tender, Non-distended, Normal bowel sounds, No organomegaly.    Genitourinary:  No costovertebral angle tenderness, No inguinal tenderness, No urethral discharge, No lesions, External genitalia normal to inspection.  Vagina and cervix appear normal with speculum examination., uterus normal size and mobility without mass or tenderness. Adnexa without mass or tenderness..    Lymphatics:  No lymphadenopathy neck, axilla, groin.    Musculoskeletal:  Normal range of motion, Normal strength, No tenderness, No swelling, No deformity, Normal gait.    Integumentary:  Warm, Dry, Pink.    Neurologic:  Normal sensory, Normal motor function, No focal deficits, Cranial Nerves II-XII are grossly intact, Normal deep tendon reflexes.    Psychiatric:  Cooperative, Appropriate mood & affect, Normal judgment.

## 2022-02-16 NOTE — PROGRESS NOTES
Patient:   TYLER ANDRADE            MRN: [id number]            FIN: [id number]               Age:   54 years     Sex:  Female     :  1965   Associated Diagnoses:   Sore throat   Author:   Giovanni Rothman MD      Impression and Plan   Diagnosis     Sore throat (XSI03-NT J02.9).     Course:  Worsening.    Plan:  due to exposure will cover with Amoxicillin until culture results.    Summary:  exposed to grandchildren 5 days ago and was notified they had strep throat yesterday.    Orders     Orders   Charges (Evaluation and Management):  69216 office outpatient visit 15 minutes (Charge) (Order): Quantity: 1, Sore throat.     Orders (Selected)   Outpatient Orders  Ordered (In Transit)  Streptococcus, group a culture* ([facility]): Specimen Type: Throat, Collection Date: 19 14:41:00 CST  Completed  Strep Grp A AG  IA (Rapid Strep) (Request): Priority: Urgent, Sore throat  Fever  Prescriptions  Prescribed  amoxicillin 500 mg oral tablet: = 1 tab(s) ( 500 mg ), PO, TID, # 30 tab(s), 0 Refill(s), Type: Maintenance, Pharmacy: [facility], 1 tab(s) Oral tid,x10 day(s).        Visit Information      Date of Service: 2019 02:07 pm  Performing Location: [facility]  Encounter#: [id number]      Primary Care Provider (PCP):  Giovanni Rothman MD    NPI# [id number]   Visit type:  New symptom.    Accompanied by:  No one.    Source of history:  Self.    History limitation:  None.       Chief Complaint   Chief complaint discussed and confirmed correct.     2019 2:29 PM CST   Pt here for ST        History of Present Illness             The patient presents with a sore throat.  The sore throat is described as scratchy and aching.  The severity of the sore throat is severe.  The timing/course of the sore throat is constant.  The sore throat has lasted for 1 day(s).  The context of the sore throat: occurred in association with illness.  Associated symptoms consist of difficulty swallowing and achy.         Review of Systems   Constitutional:  Negative.    Eye:  Negative.    Ear/Nose/Mouth/Throat:  Sore throat.    Respiratory:  Negative.    Cardiovascular:  Negative.    Gastrointestinal:  Negative.    Genitourinary:  Negative.    Hematology/Lymphatics:  Negative.    Endocrine:  Negative.    Immunologic:  Negative.    Musculoskeletal:  Negative.    Integumentary:  Negative.    Neurologic:  Negative.    Psychiatric:  Negative.    All other systems reviewed and negative      Health Status   Allergies:    Allergic Reactions (Selected)  No known allergies   Medications:  (Selected)   Prescriptions  Prescribed  ALPRAZolam 0.25 mg oral tablet: = 1 tab(s) ( 0.25 mg ), PO, daily, PRN: for anxiety, # 30 tab(s), 0 Refill(s), Type: Maintenance, Pharmacy: [facility], 1 tab(s) Oral daily,PRN:for anxiety  amoxicillin 500 mg oral tablet: = 1 tab(s) ( 500 mg ), PO, TID, # 30 tab(s), 0 Refill(s), Type: Maintenance, Pharmacy: [facility], 1 tab(s) Oral tid,x10 day(s)  fluticasone 50 mcg/inh nasal spray: = 2 spray(s), nasal, bid, # 1 EA, 5 Refill(s), Type: Soft Stop, Pharmacy: [facility], 2 spray(s) Nasal bid  hydrochlorothiazide-lisinopril 12.5 mg-10 mg oral tablet: 0.5 tab(s), Oral, daily, Instructions: due for med appt, # 45 tab(s), 1 Refill(s), Type: Maintenance, Pharmacy: [facility], 0.5 tab(s) Oral daily,Instr:due for med appt   Problem list:    All Problems  Allergic Rhinitis / SNOMED CT 14446689 / Confirmed  Anxiety / SNOMED CT 18771840 / Confirmed  Benign essential HTN / SNOMED CT 5970528 / Confirmed  Hypertension / ICD-9-.9 / Confirmed  Methamphetamine Abuse / ICD-9-.70 / Confirmed  Multiple pulmonary nodules / SNOMED CT 2189253995 / Confirmed  Nicotine Dependence / ICD-9-.1 / Confirmed  Raynaud's disease / ICD-9-.0 / Confirmed  Sciatica / ICD-9-.3 / Confirmed      Histories   Past Medical History:    Active  Hypertension (401.9)  Anxiety (60873793)  Sciatica  (724.3)  Raynaud's disease (443.0)  Allergic Rhinitis (63410314)   Family History:    Hypertension  Mother ()  Sister     Procedure history:    History of lumbar fusion (SNOMED CT 9878785948) performed by Thien Blevins MD on 2017 at 52 Years.  Comments:  2017 1:35 PM CDT - Jannette Hyde  Day #1 - L3-4, L4-5, L5-S1 fusion.  Day #2 - L3-S1 fusion.  Carpal tunnel decompression (SNOMED CT 8255217970) in 2017 at 52 Years.  Trigger finger of right hand (SNOMED CT 116311611538572) in  at 47 Years.  Tubal ligation (SNOMED CT 661827370).  cervical cryotherapy.   Social History:        Alcohol Assessment: Denies Alcohol Use            Past      Tobacco Assessment: Current            Current, Cigarettes, 20 per day.      Exercise and Physical Activity Assessment: Regular exercise        Physical Examination   Vital signs reviewed  and within acceptable limits    Vital Signs   2019 2:29 PM CST Temperature Tympanic 97.6 DegF  LOW    Peripheral Pulse Rate 103 bpm  HI    Oxygen Saturation 97 %      Measurements from flowsheet : Measurements   2019 2:29 PM CST Height Measured - Standard 67.5 in    Weight Measured - Standard 156 lb    BSA 1.83 m2    Body Mass Index 24.07 kg/m2      General:  Alert and oriented, No acute distress.    Eye:  Pupils are equal, round and reactive to light, Extraocular movements are intact, Normal conjunctiva.    HENT:  Normocephalic, Tympanic membranes are clear, Normal hearing, Oral mucosa is moist.         Mouth: Within normal limits.         Throat: Uvula ( Within normal limits ), Tonsils ( Bilateral tonsils, Erythematous ), Pharynx ( Posterior, Erythematous ).    Neck:  Supple, Non-tender, No lymphadenopathy.    Respiratory:  Lungs are clear to auscultation, Respirations are non-labored, Breath sounds are equal.    Cardiovascular:  Normal rate, Regular rhythm, No murmur, Normal peripheral perfusion, No edema.    Integumentary:  Warm, Dry, Pink.    Psychiatric:   Cooperative, Appropriate mood & affect, Normal judgment.       Review / Management   Results review:  RST: NEG..

## 2022-02-16 NOTE — NURSING NOTE
Pre op exam, labs and EKG faxed     TO:  [facility]    FAX: [phone]    DOS: 8/30/17    DR: Gen    PROC: fusion

## 2022-02-16 NOTE — TELEPHONE ENCOUNTER
---------------------  From: Lazara Weber CMA   To: Robyn Concepcion MD; [facility] ([phone]     Cc: Stephan CASAREZ, Giovanni;      Sent: 11/29/2019 3:08:47 PM CST !  Subject: strep culture     Pt seen on 11/26/19 by ALFONZO for pharyngitis adult, rapid strep test negative but was treated with Amoxicillin 500 mg 1 tab po TID #30  until culture came back. Please advise if change is needed.    Allergies: NKA        Results:  Date Result Name Ind Value   11/26/2019 2:41 PM Culture Strep A ((A)) See comment     Test Status:       Final    Specimen Source:   NOT GIVEN    Specimen Quality:  Adequate    Result:            Group A Streptococcus isolated                       Beta-hemolytic Streptococci are predictably                       susceptible to penicillin and other beta-lactams.                       Susceptibility testing not routinely performed.---------------------  From: Robyn Concepcion MD   To: Lazara Weber CMA;     Sent: 11/29/2019 4:29:43 PM CST  Subject: RE: strep culture     no need to change, thanksCalled and LVM with directive/results. Any questions or if not improving after treatment come back in for follow up      TERRENCE Weber CMA

## 2022-02-16 NOTE — NURSING NOTE
Vital Signs Entered On:  4/10/2019 4:10 PM CDT    Performed On:  4/10/2019 4:10 PM CDT by Jayla Santana CMA               Vital Signs   Systolic Blood Pressure :   118 mmHg   Diastolic Blood Pressure :   78 mmHg   Mean Arterial Pressure :   91 mmHg   Peripheral Pulse Rate :   82 bpm   Jayla Santana CMA - 4/10/2019 4:10 PM CDT

## 2022-02-16 NOTE — TELEPHONE ENCOUNTER
Entered by Jayla Santana CMA on November 30, 2020 2:51:58 PM CST  ---------------------  From: Jayla Santana CMA   To: [facility]    Sent: 11/30/2020 2:51:53 PM CST  Subject: Medication Management     ** Not Approved: Patient needs appointment **  hydrochlorothiazide-lisinopril (LISINOP/HCTZ 10-12.5)  ONE (1) TAB(S) ORAL DAILY  Qty:  90 tab(s)        Days Supply:  90        Refills:  0          Substitutions Allowed     Route To Pharmacy - [facility]   Signed by Jayla Santana CMA            ** Patient matched by Jayla Santana CMA on 11/30/2020 2:51:09 PM CST **      ------------------------------------------  From: [facility]  To: Giovanni Rothman MD  Sent: November 30, 2020 1:50:31 PM CST  Subject: Medication Management  Due: November 26, 2020 5:03:39 PM CST     ** On Hold Pending Signature **     Drug: hydrochlorothiazide-lisinopril (hydrochlorothiazide-lisinopril 12.5 mg-10 mg oral tablet), ONE (1) TAB(S) ORAL DAILY  Quantity: 90 tab(s)  Days Supply: 90  Refills: 0  Substitutions Allowed  Notes from Pharmacy:     Dispensed Drug: hydrochlorothiazide-lisinopril (hydrochlorothiazide-lisinopril 12.5 mg-10 mg oral tablet), ONE (1) TAB(S) ORAL DAILY  Quantity: 90 tab(s)  Days Supply: 90  Refills: 0  Substitutions Allowed  Notes from Pharmacy:  ------------------------------------------

## 2022-02-16 NOTE — TELEPHONE ENCOUNTER
---------------------  From: Casandra Matson   To: Jayla Santana CMA;     Sent: 7/20/2020 11:09:54 AM CDT  Subject: General Message     Requesting a refill on lisinopril.---------------------  From: Jayla Santana CMA   To: Casandra Matson;     Sent: 7/20/2020 1:52:58 PM CDT  Subject: RE: General Message     refill sent can you please let patient know---------------------  From: Casandra Matson   To: Jayla Santana CMA;     Sent: 7/20/2020 2:06:13 PM CDT  Subject: RE: General Message     I left her a message that her refill was sent.

## 2022-02-16 NOTE — NURSING NOTE
Comprehensive Intake Entered On:  6/24/2021 4:14 PM CDT    Performed On:  6/24/2021 4:11 PM CDT by Kaila Prabhakar CMA               Summary   Chief Complaint :   Medication refill-HCTZ-lisinopril, alprazolam and Chantix   Menstrual Status :   Postmenopausal   Kaila Prabhakar CMA - 6/24/2021 4:11 PM CDT   Health Status   Allergies Verified? :   Yes   Medication History Verified? :   Yes   Medical History Verified? :   Yes   Tobacco Use? :   Current every day smoker   Tobacco Cessation Review :   Ready to quit   Kaila Prabhakar CMA - 6/24/2021 4:11 PM CDT   Meds / Allergies   (As Of: 6/24/2021 4:14:00 PM CDT)   Allergies (Active)   No known allergies  Estimated Onset Date:   Unspecified ; Created By:   Lydia Ruvalcaba; Reaction Status:   Active ; Category:   Drug ; Substance:   No known allergies ; Type:   Allergy ; Updated By:   Lydia Ruvalcaba; Reviewed Date:   6/24/2021 4:11 PM CDT        Medication List   (As Of: 6/24/2021 4:14:00 PM CDT)   Prescription/Discharge Order    hydrochlorothiazide-lisinopril  :   hydrochlorothiazide-lisinopril ; Status:   Prescribed ; Ordered As Mnemonic:   hydrochlorothiazide-lisinopril 12.5 mg-10 mg oral tablet ; Simple Display Line:   1 tab(s), Oral, daily, 90 tab(s), 1 Refill(s) ; Ordering Provider:   Giovanni Rothman MD; Catalog Code:   hydrochlorothiazide-lisinopril ; Order Dt/Tm:   12/8/2020 1:33:57 PM CST          ALPRAZolam  :   ALPRAZolam ; Status:   Prescribed ; Ordered As Mnemonic:   ALPRAZolam 0.25 mg oral tablet ; Simple Display Line:   0.25 mg, 1 tab(s), PO, daily, PRN: for anxiety, 30 tab(s), 0 Refill(s) ; Ordering Provider:   Giovanni Rothman MD; Catalog Code:   ALPRAZolam ; Order Dt/Tm:   12/8/2020 1:24:34 PM CST          fluticasone nasal  :   fluticasone nasal ; Status:   Prescribed ; Ordered As Mnemonic:   fluticasone 50 mcg/inh nasal spray ; Simple Display Line:   2 spray(s), nasal, bid, 1 EA, 5 Refill(s) ; Ordering Provider:   Giovanni Rothman MD; Catalog Code:   fluticasone  nasal ; Order Dt/Tm:   12/8/2020 1:24:28 PM CST          varenicline  :   varenicline ; Status:   Prescribed ; Ordered As Mnemonic:   Chantix Continuing Month 1 mg oral tablet ; Simple Display Line:   See Instructions, TAKE ONE (1) TABLET TWICE DAILY, 60 EA, 2 Refill(s) ; Ordering Provider:   Giovanni Rothman MD; Catalog Code:   varenicline ; Order Dt/Tm:   12/8/2020 1:23:41 PM CST            ID Risk Screen   Recent Travel History :   No recent travel   Family Member Travel History :   No recent travel   Other Exposure to Infectious Disease :   Unknown   COVID-19 Testing Status :   No positive COVID-19 test   Kaila Prabhakar CMA - 6/24/2021 4:11 PM CDT   Social History   Social History   (As Of: 6/24/2021 4:14:01 PM CDT)   Alcohol:  Denies Alcohol Use      Past   (Last Updated: 4/24/2018 4:30:32 PM CDT by Jayla Santana CMA)          Tobacco:  Current      Current, Cigarettes, 20 per day.   (Last Updated: 8/2/2010 4:13:07 PM CDT by Lydia Ruvalcaba CMA)   10 or more cigarettes (1/2 pack or more)/day in last 30 days   (Last Updated: 12/8/2020 12:59:53 PM CST by Jayla Santana CMA)   10 or more cigarettes (1/2 pack or more)/day in last 30 days   Comments:  6/24/2021 4:11 PM - Kaila Prabhakar CMA: using Chantix   (Last Updated: 6/24/2021 4:11:57 PM CDT by Kaila Prabhakar CMA)          Electronic Cigarette/Vaping:        Electronic Cigarette Use: Never.   (Last Updated: 12/8/2020 12:59:53 PM CST by Jayla Santana CMA)          Exercise:  Regular exercise      (Last Updated: 8/2/2010 4:13:17 PM CDT by Lydia Ruvalcaba CMA )

## 2022-02-16 NOTE — NURSING NOTE
Comprehensive Intake Entered On:  12/8/2020 12:59 PM CST    Performed On:  12/8/2020 12:58 PM CST by Jayla Santana CMA               Summary   Chief Complaint :   consent for video visit for med refills   Max Jayla ROSA - 12/8/2020 12:58 PM CST   Health Status   Allergies Verified? :   Yes   Medication History Verified? :   Yes   Medical History Verified? :   Yes   Tobacco Use? :   Current every day smoker   Max Jayla ROSA - 12/8/2020 12:58 PM CST   Consents   Consent for Immunization Exchange :   Consent Granted   Consent for Immunizations to Providers :   Consent Granted   Max Jayla ROSA - 12/8/2020 12:58 PM CST   Meds / Allergies   (As Of: 12/8/2020 12:59:57 PM CST)   Allergies (Active)   No known allergies  Estimated Onset Date:   Unspecified ; Created By:   Lydia Ruvalcaba; Reaction Status:   Active ; Category:   Drug ; Substance:   No known allergies ; Type:   Allergy ; Updated By:   Lydia Ruvalcaba; Reviewed Date:   12/8/2020 12:59 PM CST        Medication List   (As Of: 12/8/2020 12:59:57 PM CST)   Prescription/Discharge Order    ALPRAZolam  :   ALPRAZolam ; Status:   Prescribed ; Ordered As Mnemonic:   ALPRAZolam 0.25 mg oral tablet ; Simple Display Line:   0.25 mg, 1 tab(s), PO, daily, PRN: for anxiety, 30 tab(s), 0 Refill(s) ; Ordering Provider:   Giovanni Rothman MD; Catalog Code:   ALPRAZolam ; Order Dt/Tm:   8/15/2019 1:20:20 PM CDT          azithromycin  :   azithromycin ; Status:   Discontinued ; Ordered As Mnemonic:   azithromycin 500 mg oral tablet ; Simple Display Line:   500 mg, 1 tab(s), PO, Daily, for 7 day(s), 7 tab(s), 0 Refill(s) ; Ordering Provider:   Giovanni Rothman MD; Catalog Code:   azithromycin ; Order Dt/Tm:   3/26/2020 4:13:10 PM CDT          fluticasone nasal  :   fluticasone nasal ; Status:   Prescribed ; Ordered As Mnemonic:   fluticasone 50 mcg/inh nasal spray ; Simple Display Line:   2 spray(s), nasal, bid, 1 EA, 5 Refill(s) ; Ordering Provider:   Stephan CASAREZ  Giovanni; Catalog Code:   fluticasone nasal ; Order Dt/Tm:   8/15/2019 1:19:59 PM CDT          hydrochlorothiazide-lisinopril  :   hydrochlorothiazide-lisinopril ; Status:   Prescribed ; Ordered As Mnemonic:   hydrochlorothiazide-lisinopril 12.5 mg-10 mg oral tablet ; Simple Display Line:   1 tab(s), Oral, daily, 90 tab(s), 0 Refill(s) ; Ordering Provider:   Giovanni Rothman MD; Catalog Code:   hydrochlorothiazide-lisinopril ; Order Dt/Tm:   7/20/2020 1:52:22 PM CDT          levoFLOXacin  :   levoFLOXacin ; Status:   Discontinued ; Ordered As Mnemonic:   Levaquin 500 mg oral tablet ; Simple Display Line:   500 mg, 1 tab(s), PO, q24hr, for 10 day(s), 10 tab(s), 0 Refill(s) ; Ordering Provider:   Giovanni Rothman MD; Catalog Code:   levoFLOXacin ; Order Dt/Tm:   4/2/2020 1:06:44 PM CDT          varenicline  :   varenicline ; Status:   Prescribed ; Ordered As Mnemonic:   Chantix Continuing Month 1 mg oral tablet ; Simple Display Line:   See Instructions, TAKE ONE (1) TABLET TWICE DAILY, 56 EA, 0 Refill(s) ; Ordering Provider:   Giovanni Rothman MD; Catalog Code:   varenicline ; Order Dt/Tm:   3/30/2020 8:59:37 AM CDT            ID Risk Screen   Recent Travel History :   No recent travel   Family Member Travel History :   No recent travel   Other Exposure to Infectious Disease :   Unknown   Jayla Santana CMA - 12/8/2020 12:58 PM CST   Social History   Social History   (As Of: 12/8/2020 12:59:57 PM CST)   Alcohol:  Denies Alcohol Use      Past   (Last Updated: 4/24/2018 4:30:32 PM CDT by Jayla Santana CMA)          Tobacco:  Current      Current, Cigarettes, 20 per day.   (Last Updated: 8/2/2010 4:13:07 PM CDT by Lydia Ruvalcaba CMA)   10 or more cigarettes (1/2 pack or more)/day in last 30 days   (Last Updated: 12/8/2020 12:59:53 PM CST by Jayla Santana CMA)          Electronic Cigarette/Vaping:        Electronic Cigarette Use: Never.   (Last Updated: 12/8/2020 12:59:53 PM CST by Jayla Santana CMA)          Exercise:   Regular exercise      (Last Updated: 8/2/2010 4:13:17 PM CDT by Lydia Ruvalcaba CMA )

## 2022-02-16 NOTE — PROGRESS NOTES
Patient:   TYLER ANDRADE            MRN: [id number]            FIN: [id number]               Age:   53 years     Sex:  Female     :  1965   Associated Diagnoses:   Allergic Rhinitis; Anxiety; Benign essential HTN; Burning with urination   Author:   Giovanni Rothman MD      Impression and Plan   Diagnosis     Allergic Rhinitis (ZGO83-MV J30.1).     Course:  Worsening.    Orders     Orders   Charges (Evaluation and Management):  06665 office outpatient visit 25 minutes (Charge) (Order): Quantity: 1, Burning with urination  Anxiety  Benign essential HTN  Allergic Rhinitis.     Orders (Selected)   Prescriptions  Prescribed  fluticasone 50 mcg/inh nasal spray: 1 spray(s), nasal, daily, # 1 EA, 5 Refill(s), Type: Soft Stop, Pharmacy: [facility], 1 spray(s) nasal daily.     Diagnosis     Anxiety (DEV78-XK F41.1).     Course:  Progressing as expected.    Orders     Orders (Selected)   Prescriptions  Prescribed  ALPRAZolam 0.25 mg oral tablet: 1 tab(s) ( 0.25 mg ), PO, daily, PRN: for anxiety, # 30 tab(s), 0 Refill(s), Type: Maintenance, Pharmacy: [facility], 1 tab(s) po daily,PRN:for anxiety.     Diagnosis     Benign essential HTN (RXJ10-LJ I10).     Course:  Well controlled.    Orders     Orders (Selected)   Prescriptions  Prescribed  hydrochlorothiazide-lisinopril 12.5 mg-10 mg oral tablet: 1 tab(s), PO, Daily, # 90 tab(s), 1 Refill(s), Type: Maintenance, Pharmacy: [facility], 1 tab(s) po daily.     Diagnosis     Burning with urination (UUY28-NA R30.0).     Course:  Worsening.    Orders     Orders (Selected)   Outpatient Orders  Ordered (In Transit)  Culture, Urine, Routine* ([facility]): Specimen Type: Urine (Clean Catch), Collection Date: 18 15:50:00 CDT  Completed  Urinalysis Routine (Request): Priority: Urgent, Burning with urination  Prescriptions  Prescribed  Cipro 250 mg oral tablet: 1 tab(s) ( 250 mg ), PO, q12hr, # 14 tab(s), 0 Refill(s), Type: Maintenance, Pharmacy: [facility], 1 tab(s) po q12 hrs,x7 day(s).        Visit Information      Date of Service: 04/24/2018 03:20 pm  Performing Location: [facility]  Encounter#: [id number]      Primary Care Provider (PCP):  Giovanni Rothman MD# [id number]   Visit type:  Scheduled follow-up.    Accompanied by:  No one.    Source of history:  Self.    Referral source:  Self.    History limitation:  None.       Chief Complaint   4/24/2018 3:31 PM CDT    Pt here for med ck and uti sx        History of Present Illness             The patient presents for follow-up evaluation of hypertension.  The quality of hypertension symptom(s) since the patient's last visit is described as being unchanged.  The severity of the hypertension symptom(s) since the last visit is moderate.  Since the patient's last visit, the timing/course of hypertension symptom(s) is constant.  Exacerbating factors consist of none.  Relieving factors consist of medication.  Associated symptoms consist of none.  Prior treatment consists of lifestyle modification (weight reduction, dietary sodium restriction, increased physical activity, adoption of DASH eating plan).  Medical encounters: none.  Compliance problems: none.               The patient presents with rhinorrhea.  The rhinorrhea is from both nostrils.  The rhinorrhea is described as clear.  The severity of the rhinorrhea is severe.  The rhinorrhea is constant and is worsening.  The context of the rhinorrhea: occurred after exposure to allergens.  Exacerbating factors consist of pollen.  Relieving factors consist of allergen avoidance and medication.  Associated symptoms consist of itchy eyes, nasal congestion, sneezing, denies fever, denies headache, denies cough, denies ear pain, denies facial pain and denies sore throat.               The patient presents with anxiety.  The anxiety is characterized by difficulty concentrating, irritability, nervousness and restlessness.  The severity of the anxiety  is moderate.  The anxiety is constant.  The context of the anxiety: occurred in association with the inability to cope with stress.  Exacerbating factors consist of emotional stress and social change.  Relieving factors consist of medication and stress reduction.        Review of Systems   Constitutional:  Negative.    Eye:  Negative.    Ear/Nose/Mouth/Throat:  Negative.    Respiratory:  Negative.    Cardiovascular:  Negative.    Gastrointestinal:  Negative.    Genitourinary:  Negative.    Hematology/Lymphatics:  Negative.    Endocrine:  Negative.    Immunologic:  Negative.    Musculoskeletal:  Negative.    Integumentary:  Negative.    Neurologic:  Negative.    Psychiatric:  Negative.    All other systems reviewed and negative      Health Status   Allergies:    Allergic Reactions (Selected)  No known allergies   Medications:  (Selected)   Prescriptions  Prescribed  ALPRAZolam 0.25 mg oral tablet: 1 tab(s) ( 0.25 mg ), PO, daily, PRN: for anxiety, # 30 tab(s), 0 Refill(s), Type: Maintenance, Pharmacy: [facility], 1 tab(s) po daily,PRN:for anxiety  Chantix 1 mg oral tablet: 1 tab(s) ( 1 mg ), po, bid, # 60 tab(s), 2 Refill(s), Type: Maintenance, Pharmacy: [facility], 1 tab(s) po bid  Cipro 250 mg oral tablet: 1 tab(s) ( 250 mg ), PO, q12hr, # 14 tab(s), 0 Refill(s), Type: Maintenance, Pharmacy: [facility], 1 tab(s) po q12 hrs,x7 day(s)  fluticasone 50 mcg/inh nasal spray: 1 spray(s), nasal, daily, # 1 EA, 5 Refill(s), Type: Soft Stop, Pharmacy: [facility], 1 spray(s) nasal daily  hydrochlorothiazide-lisinopril 12.5 mg-10 mg oral tablet: 1 tab(s), PO, Daily, # 90 tab(s), 1 Refill(s), Type: Maintenance, Pharmacy: [facility], 1 tab(s) po daily   Problem list:    All Problems  Allergic Rhinitis / SNOMED CT 96610300 / Confirmed  Anxiety / SNOMED CT 74677138 / Confirmed  Benign essential HTN / SNOMED CT 5203071 / Confirmed  Hypertension / ICD-9-.9 / Confirmed  Methamphetamine  Abuse / ICD-9-.70 / Confirmed  Multiple pulmonary nodules / SNOMED CT 2770185427 / Confirmed  Nicotine Dependence / ICD-9-.1 / Confirmed  Raynaud's disease / ICD-9-.0 / Confirmed  Sciatica / ICD-9-.3 / Confirmed      Histories   Past Medical History:    Active  Hypertension (401.9)  Anxiety (90757490)  Sciatica (724.3)  Raynaud's disease (443.0)  Allergic Rhinitis (84043724)   Family History:    Hypertension  Mother ()  Sister     Procedure history:    History of lumbar fusion (SNOMED CT 8751322602) performed by Thien Blevins MD on 2017 at 52 Years.  Comments:  2017 1:35 PM - Jannette Hyde  Day #1 - L3-4, L4-5, L5-S1 fusion.  Day #2 - L3-S1 fusion.  Carpal tunnel decompression (SNOMED CT 0076642378) in 2017 at 52 Years.  Trigger finger of right hand (SNOMED CT 385296327328336) in  at 47 Years.  Tubal ligation (SNOMED CT 769460464).  cervical cryotherapy.   Social History:        Alcohol Assessment: Current            Current, Beer                     Comments:                      08/10/2010 - Roxi Canales      Tobacco Assessment: Current            Current, Cigarettes, 20 per day.      Exercise and Physical Activity Assessment: Regular exercise        Physical Examination   Vital Signs   2018 3:31 PM CDT Temperature Tympanic 97.5 DegF  LOW    Peripheral Pulse Rate 93 bpm    Systolic Blood Pressure 126 mmHg    Diastolic Blood Pressure 80 mmHg    Mean Arterial Pressure 95 mmHg    BP Site Right arm    Oxygen Saturation 99 %      Measurements from flowsheet : Measurements   2018 3:31 PM CDT Height Measured - Standard 67.5 in    Weight Measured - Standard 161.8 lb    BSA 1.87 m2    Body Mass Index 24.96 kg/m2      General:  No acute distress.    Neck:  Supple, No lymphadenopathy, No thyromegaly.    Respiratory:  Lungs are clear to auscultation, Respirations are non-labored, Breath sounds are equal, Symmetrical chest wall expansion.     Cardiovascular:  Normal rate, Regular rhythm, No murmur, No gallop, Good pulses equal in all extremities, Normal peripheral perfusion, No edema.    Gastrointestinal:  Soft, Non-tender, Non-distended, Normal bowel sounds, No organomegaly.    Integumentary:  Warm, Dry, Pink.    Neurologic:  Alert, Oriented.    Psychiatric:  Cooperative, Appropriate mood & affect.       Review / Management   Results review:  UA: positive only for trace LE.

## 2022-02-16 NOTE — PROGRESS NOTES
Patient:   TYLER ANDRADE            MRN: [id number]            FIN: [id number]               Age:   55 years     Sex:  Female     :  1965   Associated Diagnoses:   Allergic Rhinitis; Anxiety; Benign essential HTN; Nicotine Dependence   Author:   Giovanni Rothman MD      Impression and Plan   Diagnosis     Allergic Rhinitis (LAY73-GP J30.1).     Anxiety (ROD95-UF F41.1).     Benign essential HTN (RHY12-LT I10).     Nicotine Dependence (DJQ30-WF F17.200).     Course:  Improving.    Orders     Orders   Charges (Evaluation and Management):  34967 office outpatient visit 15 minutes (Charge) (Order): Quantity: 1, Anxiety  Allergic Rhinitis  Nicotine Dependence  Benign essential HTN.     Orders (Selected)   Prescriptions  Prescribed  ALPRAZolam 0.25 mg oral tablet: = 1 tab(s) ( 0.25 mg ), PO, daily, PRN: for anxiety, # 30 tab(s), 0 Refill(s), Type: Maintenance, Pharmacy: [facility], 1 tab(s) Oral daily,PRN:for anxiety, 67.5, in, 20 16:35:00 CST, Height Measured, 168, lb, 20 16:35:00 CST, Weig...  Chantix Continuing Month 1 mg oral tablet: See Instructions, Instructions: TAKE ONE (1) TABLET TWICE DAILY, # 60 EA, 2 Refill(s), Type: Maintenance, Pharmacy: [facility], TAKE ONE (1) TABLET TWICE DAILY, 67.5, in, 20 16:35:00 CST, Height Measured, 168, lb, 20 16:35:00 CST,...  fluticasone 50 mcg/inh nasal spray: = 2 spray(s), nasal, bid, # 1 EA, 5 Refill(s), Type: Soft Stop, Pharmacy: [facility], 2 spray(s) Nasal bid, 67.5, in, 20 16:35:00 CST, Height Measured, 168, lb, 20 16:35:00 CST, Weight Measured  hydrochlorothiazide-lisinopril 12.5 mg-10 mg oral tablet: 1 tab(s), Oral, daily, # 90 tab(s), 0 Refill(s), Type: Maintenance, Pharmacy: [facility], 1 tab(s) Oral daily, 67.5, in, 20 16:35:00 CST, Height Measured, 168, lb, 20 16:35:00 CST, Weight Measured.        Visit Information      Date of Service: 2020 07:19 am  Performing Location:  [facility]  Encounter#: [id number]      Primary Care Provider (PCP):  Giovanni Rothman MD    NPI# [id number]      Referring Provider:  Giovanni Rothman MD# [id number]   Visit type:  Video Visit via [employer].    Participants in room during visit:  _Patient only   Accompanied by:  No one.    Source of history:  Self.    Location of patient:  _home  Location of provider:  _ Clinic  Video Start Time:  _1316  Video End Time:   _1310    Today's visit was conducted via video conference due to the COVID-19 pandemic.  The patient's consent to proceed with a video visit has been obtained and documented.   Referral source:  Self.    History limitation:  None.       Chief Complaint   12/8/2020 12:58 PM CST   consent for video visit for med refills        History of Present Illness   Patient is a _55 year old F_ who is being evaluated via a billable video visit. Needs refill of Alprazolam which she uses rarely for panic attacks.  HCTZ/Lisinopril for HTN.  Has not check BP recently and encouraged to do so.  Recommended home BP cuff.  Chantix for smoking cessation.  Had quit but recently started smoking again.  Fluticasone for allergic rhinnitis.  No other medical complaints at this time.      Review of Systems   Constitutional:  Negative.    Eye:  Negative.    Ear/Nose/Mouth/Throat:  Negative.    Respiratory:  Negative.    Cardiovascular:  Negative.    Gastrointestinal:  Negative.    Genitourinary:  Negative.    Immunologic:  Negative.    Musculoskeletal:  Negative.    Integumentary:  Negative.    Neurologic:  Negative.    Psychiatric:  Negative.       Health Status   Allergies:    Allergic Reactions (Selected)  No known allergies   Medications:  (Selected)   Prescriptions  Prescribed  ALPRAZolam 0.25 mg oral tablet: = 1 tab(s) ( 0.25 mg ), PO, daily, PRN: for anxiety, # 30 tab(s), 0 Refill(s), Type: Maintenance, Pharmacy: [facility], 1 tab(s) Oral daily,PRN:for anxiety, 67.5, in, 02/25/20 16:35:00 CST,  Height Measured, 168, lb, 02/25/20 16:35:00 CST, Weig...  Chantix Continuing Month 1 mg oral tablet: See Instructions, Instructions: TAKE ONE (1) TABLET TWICE DAILY, # 60 EA, 2 Refill(s), Type: Maintenance, Pharmacy: [facility], TAKE ONE (1) TABLET TWICE DAILY, 67.5, in, 02/25/20 16:35:00 CST, Height Measured, 168, lb, 02/25/20 16:35:00 CST,...  fluticasone 50 mcg/inh nasal spray: = 2 spray(s), nasal, bid, # 1 EA, 5 Refill(s), Type: Soft Stop, Pharmacy: [facility], 2 spray(s) Nasal bid, 67.5, in, 02/25/20 16:35:00 CST, Height Measured, 168, lb, 02/25/20 16:35:00 CST, Weight Measured  hydrochlorothiazide-lisinopril 12.5 mg-10 mg oral tablet: 1 tab(s), Oral, daily, # 90 tab(s), 0 Refill(s), Type: Maintenance, Pharmacy: [facility], 1 tab(s) Oral daily, 67.5, in, 02/25/20 16:35:00 CST, Height Measured, 168, lb, 02/25/20 16:35:00 CST, Weight Measured,    Medications          *denotes recorded medication          ALPRAZolam 0.25 mg oral tablet: 0.25 mg, 1 tab(s), PO, daily, PRN: for anxiety, 30 tab(s), 0 Refill(s).          fluticasone 50 mcg/inh nasal spray: 2 spray(s), nasal, bid, 1 EA, 5 Refill(s).          hydrochlorothiazide-lisinopril 12.5 mg-10 mg oral tablet: 1 tab(s), Oral, daily, 90 tab(s), 0 Refill(s).          Chantix Continuing Month 1 mg oral tablet: See Instructions, TAKE ONE (1) TABLET TWICE DAILY, 60 EA, 2 Refill(s).       Problem list:    All Problems  Allergic Rhinitis / SNOMED CT 56973259 / Confirmed  Anxiety / SNOMED CT 47710076 / Confirmed  Benign essential HTN / SNOMED CT 1794380 / Confirmed  Hypertension / ICD-9-.9 / Confirmed  Methamphetamine Abuse / ICD-9-.70 / Confirmed  Multiple pulmonary nodules / SNOMED CT 2920686833 / Confirmed  Nicotine Dependence / ICD-9-.1 / Confirmed  Raynaud's disease / ICD-9-.0 / Confirmed  Sciatica / ICD-9-.3 / Confirmed      Histories   Past Medical History:    Active  Hypertension (401.9)  Anxiety  (60116208)  Sciatica (724.3)  Raynaud's disease (443.0)  Allergic Rhinitis (00356236)   Family History:    Hypertension  Mother ()  Sister     Procedure history:    History of lumbar fusion (SNOMED CT 6006768008) performed by Thien Blevins MD on 2017 at 52 Years.  Comments:  2017 1:35 PM CDT - Jannette Hyde  Day #1 - L3-4, L4-5, L5-S1 fusion.  Day #2 - L3-S1 fusion.  Carpal tunnel decompression (SNOMED CT 5439677143) in 2017 at 52 Years.  Trigger finger of right hand (SNOMED CT 969956174236690) in  at 47 Years.  Tubal ligation (SNOMED CT 918737491).  cervical cryotherapy.   Social History:        Electronic Cigarette/Vaping Assessment            Electronic Cigarette Use: Never.      Alcohol Assessment: Denies Alcohol Use            Past      Tobacco Assessment: Current            Current, Cigarettes, 20 per day.            10 or more cigarettes (1/2 pack or more)/day in last 30 days      Exercise and Physical Activity Assessment: Regular exercise        Physical Examination   General:  Alert and oriented, No acute distress.    Eye:  Pupils are equal, round and reactive to light, Extraocular movements are intact, Normal conjunctiva.    Respiratory:  Respirations are non-labored.    Integumentary:  Warm, Dry, Pink.    Neurologic:  Normal motor function.    Psychiatric:  Cooperative, Appropriate mood & affect, Normal judgment, Non-suicidal.         Mood and affect: Calm.         Behavior: Relaxed.         Judgment: Able to make sensible decisions, Appropriate in social situations.         Thought process: Appropriate.       Health Maintenance      Recommendations     Pending (in the next year)        OverDue           Breast Cancer Screen due  09  and every 1  year(s)           Cervical Cancer Screen (if sexually active) due  12  and every 3  year(s)           Lipid Disorders Screen (Female) due  16  and every 1  year(s)           Alcohol Misuse Screen (Female) due  19  and  every 1  year(s)           Depression Screen (Female) due  04/24/19  and every 1  year(s)           Influenza Vaccine due  08/31/20  and every 1  year(s)        Due            Aspirin Therapy for Prevention of CVD and CRC (Female) due  12/08/20  and every 5  year(s)           Colorectal Cancer Screen (Colonoscopy) (Female) due  12/08/20  Variable frequency           Colorectal Cancer Screen (Occult Blood) (Female) due  12/08/20  Variable frequency           Colorectal Cancer Screen (Sigmoidoscopy) (Female) due  12/08/20  Variable frequency        Due In Future            Body Mass Index Check (Female) not due until  02/25/21  and every 1  year(s)           High Blood Pressure Screen (Female) not due until  02/25/21  and every 1  year(s)     Satisfied (in the past 1 year)        Satisfied            Body Mass Index Check (Female) on  02/25/20.           High Blood Pressure Screen (Female) on  02/25/20.

## 2022-02-16 NOTE — LETTER
(Inserted Image. Unable to display)   June 09, 2021  [place] [address]          Dear TYLER,      Thank you for selecting [facility] for your healthcare needs.    Our records indicate you are due for the following services:    Hypertension check ~ please remember to bring your at-home blood pressure readings with you to your appointment.   Non-Fasting Labs    If you had your labs done at another facility or with Direct Access Lab Testing at [facility], please bring in a copy of the results to your next visit, mail a copy, or drop off a copy of your results to your Healthcare Provider.    You are due for lab work and an office visit; please schedule the lab appointment 1 week before the office visit.  This will assure all results are available to discuss with your Healthcare Provider during your visit.    (FYI   Regarding office visits: In some instances, a video visit or telephone visit may be offered as an option.)      **It is very helpful if you bring your medication bottles to your appointment.  This assures we have all of your current medications, including strength and dosing information, documented accurately in your medical record.    To schedule an appointment or if you have further questions, please contact your clinic at [phone].     Powered by [employer]    Sincerely,    Healthcare Providers at [facility]